# Patient Record
Sex: FEMALE | Race: BLACK OR AFRICAN AMERICAN | NOT HISPANIC OR LATINO | URBAN - METROPOLITAN AREA
[De-identification: names, ages, dates, MRNs, and addresses within clinical notes are randomized per-mention and may not be internally consistent; named-entity substitution may affect disease eponyms.]

---

## 2017-05-08 ENCOUNTER — EMERGENCY (EMERGENCY)
Facility: HOSPITAL | Age: 32
LOS: 1 days | Discharge: HOME | End: 2017-05-08
Attending: EMERGENCY MEDICINE | Admitting: EMERGENCY MEDICINE
Payer: COMMERCIAL

## 2017-05-08 VITALS
HEART RATE: 61 BPM | DIASTOLIC BLOOD PRESSURE: 75 MMHG | OXYGEN SATURATION: 100 % | RESPIRATION RATE: 20 BRPM | SYSTOLIC BLOOD PRESSURE: 111 MMHG | TEMPERATURE: 99 F

## 2017-05-08 VITALS
DIASTOLIC BLOOD PRESSURE: 78 MMHG | SYSTOLIC BLOOD PRESSURE: 108 MMHG | RESPIRATION RATE: 16 BRPM | HEART RATE: 60 BPM | OXYGEN SATURATION: 100 %

## 2017-05-08 LAB
ALBUMIN SERPL ELPH-MCNC: 4.8 G/DL — SIGNIFICANT CHANGE UP (ref 3.3–5)
ALP SERPL-CCNC: 53 U/L — SIGNIFICANT CHANGE UP (ref 40–120)
ALT FLD-CCNC: 12 U/L — SIGNIFICANT CHANGE UP (ref 4–33)
APPEARANCE UR: CLEAR — SIGNIFICANT CHANGE UP
AST SERPL-CCNC: 17 U/L — SIGNIFICANT CHANGE UP (ref 4–32)
BASOPHILS # BLD AUTO: 0.01 K/UL — SIGNIFICANT CHANGE UP (ref 0–0.2)
BASOPHILS NFR BLD AUTO: 0.2 % — SIGNIFICANT CHANGE UP (ref 0–2)
BILIRUB SERPL-MCNC: 0.6 MG/DL — SIGNIFICANT CHANGE UP (ref 0.2–1.2)
BILIRUB UR-MCNC: NEGATIVE — SIGNIFICANT CHANGE UP
BLOOD UR QL VISUAL: NEGATIVE — SIGNIFICANT CHANGE UP
BUN SERPL-MCNC: 8 MG/DL — SIGNIFICANT CHANGE UP (ref 7–23)
CALCIUM SERPL-MCNC: 9.6 MG/DL — SIGNIFICANT CHANGE UP (ref 8.4–10.5)
CHLORIDE SERPL-SCNC: 101 MMOL/L — SIGNIFICANT CHANGE UP (ref 98–107)
CO2 SERPL-SCNC: 26 MMOL/L — SIGNIFICANT CHANGE UP (ref 22–31)
COLOR SPEC: SIGNIFICANT CHANGE UP
CREAT SERPL-MCNC: 0.93 MG/DL — SIGNIFICANT CHANGE UP (ref 0.5–1.3)
EOSINOPHIL # BLD AUTO: 0.01 K/UL — SIGNIFICANT CHANGE UP (ref 0–0.5)
EOSINOPHIL NFR BLD AUTO: 0.2 % — SIGNIFICANT CHANGE UP (ref 0–6)
GLUCOSE SERPL-MCNC: 90 MG/DL — SIGNIFICANT CHANGE UP (ref 70–99)
GLUCOSE UR-MCNC: NEGATIVE — SIGNIFICANT CHANGE UP
HCG UR-SCNC: NEGATIVE — SIGNIFICANT CHANGE UP
HCT VFR BLD CALC: 39.5 % — SIGNIFICANT CHANGE UP (ref 34.5–45)
HGB BLD-MCNC: 13.7 G/DL — SIGNIFICANT CHANGE UP (ref 11.5–15.5)
IMM GRANULOCYTES NFR BLD AUTO: 0.2 % — SIGNIFICANT CHANGE UP (ref 0–1.5)
KETONES UR-MCNC: NEGATIVE — SIGNIFICANT CHANGE UP
LEUKOCYTE ESTERASE UR-ACNC: NEGATIVE — SIGNIFICANT CHANGE UP
LYMPHOCYTES # BLD AUTO: 1.85 K/UL — SIGNIFICANT CHANGE UP (ref 1–3.3)
LYMPHOCYTES # BLD AUTO: 32.5 % — SIGNIFICANT CHANGE UP (ref 13–44)
MCHC RBC-ENTMCNC: 29.6 PG — SIGNIFICANT CHANGE UP (ref 27–34)
MCHC RBC-ENTMCNC: 34.7 % — SIGNIFICANT CHANGE UP (ref 32–36)
MCV RBC AUTO: 85.3 FL — SIGNIFICANT CHANGE UP (ref 80–100)
MONOCYTES # BLD AUTO: 0.19 K/UL — SIGNIFICANT CHANGE UP (ref 0–0.9)
MONOCYTES NFR BLD AUTO: 3.3 % — SIGNIFICANT CHANGE UP (ref 2–14)
NEUTROPHILS # BLD AUTO: 3.63 K/UL — SIGNIFICANT CHANGE UP (ref 1.8–7.4)
NEUTROPHILS NFR BLD AUTO: 63.6 % — SIGNIFICANT CHANGE UP (ref 43–77)
NITRITE UR-MCNC: NEGATIVE — SIGNIFICANT CHANGE UP
PH UR: 7.5 — SIGNIFICANT CHANGE UP (ref 4.6–8)
PLATELET # BLD AUTO: 274 K/UL — SIGNIFICANT CHANGE UP (ref 150–400)
PMV BLD: 9.5 FL — SIGNIFICANT CHANGE UP (ref 7–13)
POTASSIUM SERPL-MCNC: 4.5 MMOL/L — SIGNIFICANT CHANGE UP (ref 3.5–5.3)
POTASSIUM SERPL-SCNC: 4.5 MMOL/L — SIGNIFICANT CHANGE UP (ref 3.5–5.3)
PROT SERPL-MCNC: 8.5 G/DL — HIGH (ref 6–8.3)
PROT UR-MCNC: NEGATIVE — SIGNIFICANT CHANGE UP
RBC # BLD: 4.63 M/UL — SIGNIFICANT CHANGE UP (ref 3.8–5.2)
RBC # FLD: 12.6 % — SIGNIFICANT CHANGE UP (ref 10.3–14.5)
SODIUM SERPL-SCNC: 139 MMOL/L — SIGNIFICANT CHANGE UP (ref 135–145)
SP GR SPEC: 1.01 — SIGNIFICANT CHANGE UP (ref 1–1.03)
SQUAMOUS # UR AUTO: SIGNIFICANT CHANGE UP
UROBILINOGEN FLD QL: NORMAL E.U. — SIGNIFICANT CHANGE UP (ref 0.1–0.2)
WBC # BLD: 5.7 K/UL — SIGNIFICANT CHANGE UP (ref 3.8–10.5)
WBC # FLD AUTO: 5.7 K/UL — SIGNIFICANT CHANGE UP (ref 3.8–10.5)

## 2017-05-08 PROCEDURE — 99285 EMERGENCY DEPT VISIT HI MDM: CPT

## 2017-05-08 PROCEDURE — 74177 CT ABD & PELVIS W/CONTRAST: CPT | Mod: 26

## 2017-05-08 PROCEDURE — 76830 TRANSVAGINAL US NON-OB: CPT | Mod: 26

## 2017-05-08 RX ORDER — ONDANSETRON 8 MG/1
4 TABLET, FILM COATED ORAL ONCE
Qty: 0 | Refills: 0 | Status: COMPLETED | OUTPATIENT
Start: 2017-05-08 | End: 2017-05-08

## 2017-05-08 RX ORDER — SODIUM CHLORIDE 9 MG/ML
1000 INJECTION INTRAMUSCULAR; INTRAVENOUS; SUBCUTANEOUS ONCE
Qty: 0 | Refills: 0 | Status: COMPLETED | OUTPATIENT
Start: 2017-05-08 | End: 2017-05-08

## 2017-05-08 RX ORDER — ACETAMINOPHEN 500 MG
1000 TABLET ORAL ONCE
Qty: 0 | Refills: 0 | Status: COMPLETED | OUTPATIENT
Start: 2017-05-08 | End: 2017-05-08

## 2017-05-08 RX ORDER — ONDANSETRON 8 MG/1
1 TABLET, FILM COATED ORAL
Qty: 8 | Refills: 0 | OUTPATIENT
Start: 2017-05-08 | End: 2017-05-12

## 2017-05-08 RX ORDER — MORPHINE SULFATE 50 MG/1
4 CAPSULE, EXTENDED RELEASE ORAL ONCE
Qty: 0 | Refills: 0 | Status: DISCONTINUED | OUTPATIENT
Start: 2017-05-08 | End: 2017-05-08

## 2017-05-08 RX ORDER — KETOROLAC TROMETHAMINE 30 MG/ML
15 SYRINGE (ML) INJECTION ONCE
Qty: 0 | Refills: 0 | Status: DISCONTINUED | OUTPATIENT
Start: 2017-05-08 | End: 2017-05-08

## 2017-05-08 RX ADMIN — SODIUM CHLORIDE 1000 MILLILITER(S): 9 INJECTION INTRAMUSCULAR; INTRAVENOUS; SUBCUTANEOUS at 09:24

## 2017-05-08 RX ADMIN — Medication 15 MILLIGRAM(S): at 09:33

## 2017-05-08 RX ADMIN — MORPHINE SULFATE 4 MILLIGRAM(S): 50 CAPSULE, EXTENDED RELEASE ORAL at 09:38

## 2017-05-08 RX ADMIN — ONDANSETRON 4 MILLIGRAM(S): 8 TABLET, FILM COATED ORAL at 09:35

## 2017-05-08 RX ADMIN — Medication 15 MILLIGRAM(S): at 09:58

## 2017-05-08 RX ADMIN — Medication 400 MILLIGRAM(S): at 10:57

## 2017-05-08 RX ADMIN — MORPHINE SULFATE 4 MILLIGRAM(S): 50 CAPSULE, EXTENDED RELEASE ORAL at 09:58

## 2017-05-08 NOTE — ED PROVIDER NOTE - OBJECTIVE STATEMENT
31F pmh chronic pain p/w R flank, RLQ, R leg pain that's acute on chronic associated w/ new n/v//d. pt has had this pain for 3 years, but worse today. pt states percocet helps her pain. denies any vaginal bleeding, vaginal dc, dysuria, hematuria, cp, sob, hx sti, f. pt has had colonscopy, egd, dx laproscopy to r/o endometriosis all of which have been normal

## 2017-05-08 NOTE — ED ADULT TRIAGE NOTE - CHIEF COMPLAINT QUOTE
Pt co RLQ pain that radiates into right flank area and down leg since this am with nausea and vomiting. Pt is afebrile in triage . LMP 4/20.

## 2017-05-08 NOTE — ED PROVIDER NOTE - MEDICAL DECISION MAKING DETAILS
acute on chronic pain but with new n/v/d. ddx: msk pain. will r/o ovarian torsion.  -labs -tvus -symptomatic treatment -ivf -ucg negative - acute on chronic pain but with new n/v/d. ddx: msk pain. will r/o ovarian torsion.  -labs -tvus -symptomatic treatment -ivf -ucg negative -  Fink att: 32 yo woman presenting with acute on chronic abdominal pain.  States pain is exactly the same and no different from previous episodes.  With RLQ pain, appendicitis is a consideration.  Patient has had multiple CT abdomen's in the past, I had a lengthy discussion with patient as to the morbidity and motrality of appendicitis vs. the risk of radiation from repeated CT.  Shared decision making employed, patient declines CT at this time.

## 2017-05-08 NOTE — ED PROVIDER NOTE - PHYSICAL EXAMINATION
Ayah att: General: Well appearing, nontoxic, no acute distress; Head: Normocephalic Atraumatic; Eyes: PERRL, EOMI; ENT: Airway patent; Neck: supple; Chest: Lungs clear to auscultation bilateral; Cardiac: Regular rate and rhythm, no murmurs, rubs or gallops; Abdomen: soft, no mcburneys tenderness, tender at right low pelvis, nondistended; no guarding or rebound; Musculoskeletal: Calves symmetric, nontender, no palpable cord; Skin: No rash, normal skin tone; Neuro: Alert and Oriented to person, place, and time; No focal deficit, CN 2-12 symmetric and intact, sttrength 5/5 LEs

## 2017-05-08 NOTE — ED PROVIDER NOTE - PROGRESS NOTE DETAILS
maxime: PT seen and reassessed.  Patient symptomatically improved.   AAOX3, NAD, VSS.  Discussed test results w/ patient. Patient verbalized understanding of hospital course and outpatient plans, has decisional making capacity.  Will f/u w/ pmd in the next few days; patient will call for an appointment. Will return to the ED if there is any worsening of symptoms.  Patient able to ambulate at baseline, is tolerating PO intake

## 2018-08-17 ENCOUNTER — APPOINTMENT (OUTPATIENT)
Dept: RHEUMATOLOGY | Facility: CLINIC | Age: 33
End: 2018-08-17
Payer: COMMERCIAL

## 2018-08-17 VITALS
BODY MASS INDEX: 21.97 KG/M2 | HEART RATE: 69 BPM | SYSTOLIC BLOOD PRESSURE: 103 MMHG | RESPIRATION RATE: 16 BRPM | OXYGEN SATURATION: 98 % | HEIGHT: 67 IN | WEIGHT: 140 LBS | DIASTOLIC BLOOD PRESSURE: 67 MMHG

## 2018-08-17 PROCEDURE — 99204 OFFICE O/P NEW MOD 45 MIN: CPT

## 2018-08-18 LAB — RHEUMATOID FACT SER QL: <10 IU/ML

## 2018-08-19 LAB
CCP AB SER IA-ACNC: <8 UNITS
RF+CCP IGG SER-IMP: NEGATIVE

## 2018-08-20 LAB
ANA SER IF-ACNC: NEGATIVE
HLA-B27 RELATED AG QL: NORMAL

## 2018-08-21 LAB
ENA SS-A AB SER IA-ACNC: <0.2 AL
ENA SS-B AB SER IA-ACNC: <0.2 AL

## 2018-08-22 ENCOUNTER — FORM ENCOUNTER (OUTPATIENT)
Age: 33
End: 2018-08-22

## 2018-08-23 ENCOUNTER — OUTPATIENT (OUTPATIENT)
Dept: OUTPATIENT SERVICES | Facility: HOSPITAL | Age: 33
LOS: 1 days | End: 2018-08-23
Payer: COMMERCIAL

## 2018-08-23 ENCOUNTER — APPOINTMENT (OUTPATIENT)
Dept: RADIOLOGY | Facility: CLINIC | Age: 33
End: 2018-08-23
Payer: COMMERCIAL

## 2018-08-23 ENCOUNTER — APPOINTMENT (OUTPATIENT)
Dept: ULTRASOUND IMAGING | Facility: CLINIC | Age: 33
End: 2018-08-23
Payer: COMMERCIAL

## 2018-08-23 DIAGNOSIS — R59.0 LOCALIZED ENLARGED LYMPH NODES: ICD-10-CM

## 2018-08-23 DIAGNOSIS — M89.9 DISORDER OF BONE, UNSPECIFIED: ICD-10-CM

## 2018-08-23 DIAGNOSIS — M54.5 LOW BACK PAIN: ICD-10-CM

## 2018-08-23 DIAGNOSIS — M41.9 SCOLIOSIS, UNSPECIFIED: ICD-10-CM

## 2018-08-23 PROCEDURE — 73522 X-RAY EXAM HIPS BI 3-4 VIEWS: CPT | Mod: 26

## 2018-08-23 PROCEDURE — 72082 X-RAY EXAM ENTIRE SPI 2/3 VW: CPT | Mod: 26

## 2018-08-23 PROCEDURE — 76536 US EXAM OF HEAD AND NECK: CPT | Mod: 26

## 2018-08-23 PROCEDURE — 76536 US EXAM OF HEAD AND NECK: CPT

## 2018-08-23 PROCEDURE — 73522 X-RAY EXAM HIPS BI 3-4 VIEWS: CPT

## 2018-08-23 PROCEDURE — 72082 X-RAY EXAM ENTIRE SPI 2/3 VW: CPT

## 2018-09-09 ENCOUNTER — FORM ENCOUNTER (OUTPATIENT)
Age: 33
End: 2018-09-09

## 2018-09-10 ENCOUNTER — APPOINTMENT (OUTPATIENT)
Dept: MRI IMAGING | Facility: CLINIC | Age: 33
End: 2018-09-10
Payer: COMMERCIAL

## 2018-09-10 ENCOUNTER — OUTPATIENT (OUTPATIENT)
Dept: OUTPATIENT SERVICES | Facility: HOSPITAL | Age: 33
LOS: 1 days | End: 2018-09-10
Payer: COMMERCIAL

## 2018-09-10 DIAGNOSIS — M89.9 DISORDER OF BONE, UNSPECIFIED: ICD-10-CM

## 2018-09-10 DIAGNOSIS — Z00.8 ENCOUNTER FOR OTHER GENERAL EXAMINATION: ICD-10-CM

## 2018-09-10 PROCEDURE — 72195 MRI PELVIS W/O DYE: CPT | Mod: 26

## 2018-09-10 PROCEDURE — 72195 MRI PELVIS W/O DYE: CPT

## 2018-09-19 ENCOUNTER — APPOINTMENT (OUTPATIENT)
Dept: RHEUMATOLOGY | Facility: CLINIC | Age: 33
End: 2018-09-19
Payer: COMMERCIAL

## 2018-09-19 VITALS
RESPIRATION RATE: 14 BRPM | DIASTOLIC BLOOD PRESSURE: 72 MMHG | SYSTOLIC BLOOD PRESSURE: 113 MMHG | OXYGEN SATURATION: 98 % | HEIGHT: 67 IN | HEART RATE: 70 BPM | BODY MASS INDEX: 21.97 KG/M2 | TEMPERATURE: 97.8 F | WEIGHT: 140 LBS

## 2018-09-19 PROCEDURE — 99213 OFFICE O/P EST LOW 20 MIN: CPT

## 2018-10-01 ENCOUNTER — FORM ENCOUNTER (OUTPATIENT)
Age: 33
End: 2018-10-01

## 2018-10-02 ENCOUNTER — OUTPATIENT (OUTPATIENT)
Dept: OUTPATIENT SERVICES | Facility: HOSPITAL | Age: 33
LOS: 1 days | End: 2018-10-02
Payer: COMMERCIAL

## 2018-10-02 ENCOUNTER — APPOINTMENT (OUTPATIENT)
Dept: MRI IMAGING | Facility: CLINIC | Age: 33
End: 2018-10-02
Payer: COMMERCIAL

## 2018-10-02 DIAGNOSIS — M54.5 LOW BACK PAIN: ICD-10-CM

## 2018-10-02 PROCEDURE — 72148 MRI LUMBAR SPINE W/O DYE: CPT | Mod: 26

## 2018-10-02 PROCEDURE — 72148 MRI LUMBAR SPINE W/O DYE: CPT

## 2018-12-17 NOTE — ED PROVIDER NOTE - PSH
Patient is requesting a refill for her Aledronate and Omeprazole prescriptions.
No significant past surgical history

## 2019-11-27 ENCOUNTER — APPOINTMENT (OUTPATIENT)
Dept: RHEUMATOLOGY | Facility: CLINIC | Age: 34
End: 2019-11-27
Payer: COMMERCIAL

## 2019-11-27 VITALS
BODY MASS INDEX: 22.29 KG/M2 | SYSTOLIC BLOOD PRESSURE: 107 MMHG | TEMPERATURE: 97.6 F | HEART RATE: 73 BPM | OXYGEN SATURATION: 99 % | HEIGHT: 67 IN | DIASTOLIC BLOOD PRESSURE: 69 MMHG | WEIGHT: 142 LBS

## 2019-11-27 PROCEDURE — 99204 OFFICE O/P NEW MOD 45 MIN: CPT

## 2019-11-27 PROCEDURE — 99214 OFFICE O/P EST MOD 30 MIN: CPT

## 2019-11-27 RX ORDER — GABAPENTIN 300 MG/1
300 CAPSULE ORAL
Refills: 0 | Status: DISCONTINUED | COMMUNITY
End: 2019-11-27

## 2019-11-29 LAB
ALBUMIN SERPL ELPH-MCNC: 4.5 G/DL
ALP BLD-CCNC: 39 U/L
ALT SERPL-CCNC: 8 U/L
ANION GAP SERPL CALC-SCNC: 11 MMOL/L
APPEARANCE: CLEAR
AST SERPL-CCNC: 13 U/L
BASOPHILS # BLD AUTO: 0.04 K/UL
BASOPHILS NFR BLD AUTO: 0.7 %
BILIRUB SERPL-MCNC: 0.4 MG/DL
BILIRUBIN URINE: NEGATIVE
BLOOD URINE: NEGATIVE
BUN SERPL-MCNC: 9 MG/DL
CALCIUM SERPL-MCNC: 9.1 MG/DL
CHLORIDE SERPL-SCNC: 104 MMOL/L
CO2 SERPL-SCNC: 25 MMOL/L
COLOR: NORMAL
CREAT SERPL-MCNC: 0.91 MG/DL
CRP SERPL-MCNC: <0.1 MG/DL
EOSINOPHIL # BLD AUTO: 0.08 K/UL
EOSINOPHIL NFR BLD AUTO: 1.4 %
ERYTHROCYTE [SEDIMENTATION RATE] IN BLOOD BY WESTERGREN METHOD: 7 MM/HR
GLUCOSE QUALITATIVE U: NEGATIVE
GLUCOSE SERPL-MCNC: 78 MG/DL
HCT VFR BLD CALC: 36.8 %
HGB BLD-MCNC: 12.6 G/DL
IMM GRANULOCYTES NFR BLD AUTO: 0 %
KETONES URINE: NEGATIVE
LEUKOCYTE ESTERASE URINE: NEGATIVE
LYMPHOCYTES # BLD AUTO: 3.03 K/UL
LYMPHOCYTES NFR BLD AUTO: 53.3 %
MAN DIFF?: NORMAL
MCHC RBC-ENTMCNC: 30.9 PG
MCHC RBC-ENTMCNC: 34.2 GM/DL
MCV RBC AUTO: 90.2 FL
MONOCYTES # BLD AUTO: 0.37 K/UL
MONOCYTES NFR BLD AUTO: 6.5 %
NEUTROPHILS # BLD AUTO: 2.16 K/UL
NEUTROPHILS NFR BLD AUTO: 38.1 %
NITRITE URINE: NEGATIVE
PH URINE: 6.5
PLATELET # BLD AUTO: 290 K/UL
POTASSIUM SERPL-SCNC: 4.1 MMOL/L
PROT SERPL-MCNC: 7 G/DL
PROTEIN URINE: NEGATIVE
RBC # BLD: 4.08 M/UL
RBC # FLD: 12.3 %
SODIUM SERPL-SCNC: 140 MMOL/L
SPECIFIC GRAVITY URINE: 1.01
TSH SERPL-ACNC: 0.54 UIU/ML
UROBILINOGEN URINE: NORMAL
WBC # FLD AUTO: 5.68 K/UL

## 2019-12-01 ENCOUNTER — FORM ENCOUNTER (OUTPATIENT)
Age: 34
End: 2019-12-01

## 2019-12-02 ENCOUNTER — APPOINTMENT (OUTPATIENT)
Dept: RADIOLOGY | Facility: IMAGING CENTER | Age: 34
End: 2019-12-02
Payer: COMMERCIAL

## 2019-12-02 ENCOUNTER — OUTPATIENT (OUTPATIENT)
Dept: OUTPATIENT SERVICES | Facility: HOSPITAL | Age: 34
LOS: 1 days | End: 2019-12-02
Payer: COMMERCIAL

## 2019-12-02 DIAGNOSIS — M46.1 SACROILIITIS, NOT ELSEWHERE CLASSIFIED: ICD-10-CM

## 2019-12-02 DIAGNOSIS — R10.2 PELVIC AND PERINEAL PAIN: ICD-10-CM

## 2019-12-02 PROCEDURE — 72200 X-RAY EXAM SI JOINTS: CPT | Mod: 26

## 2019-12-02 PROCEDURE — 72200 X-RAY EXAM SI JOINTS: CPT

## 2019-12-12 ENCOUNTER — CHART COPY (OUTPATIENT)
Age: 34
End: 2019-12-12

## 2019-12-25 ENCOUNTER — FORM ENCOUNTER (OUTPATIENT)
Age: 34
End: 2019-12-25

## 2019-12-26 ENCOUNTER — OUTPATIENT (OUTPATIENT)
Dept: OUTPATIENT SERVICES | Facility: HOSPITAL | Age: 34
LOS: 1 days | End: 2019-12-26
Payer: COMMERCIAL

## 2019-12-26 ENCOUNTER — APPOINTMENT (OUTPATIENT)
Dept: MRI IMAGING | Facility: CLINIC | Age: 34
End: 2019-12-26
Payer: COMMERCIAL

## 2019-12-26 DIAGNOSIS — R10.2 PELVIC AND PERINEAL PAIN: ICD-10-CM

## 2019-12-26 DIAGNOSIS — M45.1 ANKYLOSING SPONDYLITIS OF OCCIPITO-ATLANTO-AXIAL REGION: ICD-10-CM

## 2019-12-26 PROCEDURE — A9585: CPT

## 2019-12-26 PROCEDURE — 72197 MRI PELVIS W/O & W/DYE: CPT | Mod: 26

## 2019-12-26 PROCEDURE — 72197 MRI PELVIS W/O & W/DYE: CPT

## 2020-01-28 ENCOUNTER — APPOINTMENT (OUTPATIENT)
Dept: RHEUMATOLOGY | Facility: CLINIC | Age: 35
End: 2020-01-28

## 2020-06-04 ENCOUNTER — OUTPATIENT (OUTPATIENT)
Dept: OUTPATIENT SERVICES | Facility: HOSPITAL | Age: 35
LOS: 1 days | End: 2020-06-04

## 2020-06-04 LAB — SARS-COV-2 RNA SPEC QL NAA+PROBE: SIGNIFICANT CHANGE UP

## 2020-11-09 ENCOUNTER — TRANSCRIPTION ENCOUNTER (OUTPATIENT)
Age: 35
End: 2020-11-09

## 2020-11-17 ENCOUNTER — APPOINTMENT (OUTPATIENT)
Dept: RHEUMATOLOGY | Facility: CLINIC | Age: 35
End: 2020-11-17

## 2021-01-04 ENCOUNTER — APPOINTMENT (OUTPATIENT)
Dept: UROLOGY | Facility: CLINIC | Age: 36
End: 2021-01-04
Payer: COMMERCIAL

## 2021-01-04 VITALS
OXYGEN SATURATION: 97 % | HEART RATE: 80 BPM | SYSTOLIC BLOOD PRESSURE: 107 MMHG | DIASTOLIC BLOOD PRESSURE: 71 MMHG | BODY MASS INDEX: 22.29 KG/M2 | WEIGHT: 142 LBS | TEMPERATURE: 98.3 F | HEIGHT: 67 IN | RESPIRATION RATE: 14 BRPM

## 2021-01-04 DIAGNOSIS — Z80.8 FAMILY HISTORY OF MALIGNANT NEOPLASM OF OTHER ORGANS OR SYSTEMS: ICD-10-CM

## 2021-01-04 DIAGNOSIS — N28.1 CYST OF KIDNEY, ACQUIRED: ICD-10-CM

## 2021-01-04 DIAGNOSIS — Z80.42 FAMILY HISTORY OF MALIGNANT NEOPLASM OF PROSTATE: ICD-10-CM

## 2021-01-04 DIAGNOSIS — Z84.1 FAMILY HISTORY OF DISORDERS OF KIDNEY AND URETER: ICD-10-CM

## 2021-01-04 DIAGNOSIS — Z87.898 PERSONAL HISTORY OF OTHER SPECIFIED CONDITIONS: ICD-10-CM

## 2021-01-04 DIAGNOSIS — Z80.49 FAMILY HISTORY OF MALIGNANT NEOPLASM OF OTHER GENITAL ORGANS: ICD-10-CM

## 2021-01-04 PROCEDURE — 99072 ADDL SUPL MATRL&STAF TM PHE: CPT

## 2021-01-04 PROCEDURE — 99203 OFFICE O/P NEW LOW 30 MIN: CPT

## 2021-01-04 NOTE — ASSESSMENT
[FreeTextEntry1] : CT scan images were reviewed on the computer screen which showed a large left peripelvic cyst.  Simple renal cysts are sacs filled with fluid. The exact cause of renal cysts is unknown but cysts are more common with advancing age. Up to one third of patients over age 70 have renal cysts. Having many renal cysts is different than polycystic renal disease. Simple renal cysts are usually found incidentally with imaging studies (US, CT scan or MRI), are usually asymptomatic and do not require any treatment. Very large renal cysts can cause dull pain or discomfort. Cysts can rarely become infected, develop bleeding, rupture or impair renal function. Surgical removal or drainage and sclerotherapy of renal cysts can be performed in specific clinical settings. Bosniak classification of renal cysts into 5 categories was reviewed: simple (category I), minimally complex (category II and IIF), indeterminate (category III) and solid (category IV). Malignancy risk, treatment and follow-up of renal cysts based on category were discussed. Patient's questions were answered.  She will continue monitoring it for now since she is asymptomatic with repeat imaging in 1 year.

## 2021-01-04 NOTE — REVIEW OF SYSTEMS
[Eyesight Problems] : eyesight problems [Palpitations] : palpitations [Abdominal Pain] : abdominal pain [Vomiting] : vomiting [Loss of interest] : loss of interest in sexual activity [Genital yeast infection] : genital yeast infection [Skin Lesions] : skin lesion [Skin Wound] : skin wound [Anxiety] : anxiety [Depression] : depression [Negative] : Heme/Lymph [FreeTextEntry2] : cyst left kidney

## 2021-01-04 NOTE — PHYSICAL EXAM
[General Appearance - Well Developed] : well developed [General Appearance - Well Nourished] : well nourished [Normal Appearance] : normal appearance [Well Groomed] : well groomed [General Appearance - In No Acute Distress] : no acute distress [Edema] : no peripheral edema [Respiration, Rhythm And Depth] : normal respiratory rhythm and effort [Exaggerated Use Of Accessory Muscles For Inspiration] : no accessory muscle use [Abdomen Soft] : soft [Abdomen Tenderness] : non-tender [Costovertebral Angle Tenderness] : no ~M costovertebral angle tenderness [Urinary Bladder Findings] : the bladder was normal on palpation [FreeTextEntry1] : Bladder or kidney cyst not palpable [Normal Station and Gait] : the gait and station were normal for the patient's age [] : no rash [No Focal Deficits] : no focal deficits [Oriented To Time, Place, And Person] : oriented to person, place, and time [Affect] : the affect was normal [Mood] : the mood was normal [Not Anxious] : not anxious [Inguinal Lymph Nodes Enlarged Bilaterally] : inguinal

## 2021-01-04 NOTE — HISTORY OF PRESENT ILLNESS
[FreeTextEntry1] : She is a 35-year-old woman who is seen today for initial visit.  She has chronic right lower quadrant pain and follows with gastroenterology.  She has multiple radiological studies that have shown a large cyst in her left kidney going back several years.  However cyst is becoming larger.  She has no discomfort on her left side or urinary complaints.  CT scan with contrast in November 2020 showed a left 8.5 cm renal cyst.  For comparison, cyst measure 6 cm in 2014.  In 2019, creatinine level was 0.9.  Her grandmother had kidney stones and her grandfather had history of renal insufficiency.

## 2021-03-22 ENCOUNTER — APPOINTMENT (OUTPATIENT)
Dept: RHEUMATOLOGY | Facility: CLINIC | Age: 36
End: 2021-03-22
Payer: COMMERCIAL

## 2021-03-22 VITALS
SYSTOLIC BLOOD PRESSURE: 108 MMHG | HEIGHT: 67 IN | WEIGHT: 155 LBS | HEART RATE: 83 BPM | DIASTOLIC BLOOD PRESSURE: 71 MMHG | OXYGEN SATURATION: 98 % | BODY MASS INDEX: 24.33 KG/M2

## 2021-03-22 DIAGNOSIS — R10.2 PELVIC AND PERINEAL PAIN: ICD-10-CM

## 2021-03-22 DIAGNOSIS — M76.31 ILIOTIBIAL BAND SYNDROME, RIGHT LEG: ICD-10-CM

## 2021-03-22 DIAGNOSIS — R76.8 OTHER SPECIFIED ABNORMAL IMMUNOLOGICAL FINDINGS IN SERUM: ICD-10-CM

## 2021-03-22 DIAGNOSIS — M89.9 DISORDER OF BONE, UNSPECIFIED: ICD-10-CM

## 2021-03-22 DIAGNOSIS — M95.5 ACQUIRED DEFORMITY OF PELVIS: ICD-10-CM

## 2021-03-22 DIAGNOSIS — M62.89 OTHER SPECIFIED DISORDERS OF MUSCLE: ICD-10-CM

## 2021-03-22 DIAGNOSIS — M54.5 LOW BACK PAIN: ICD-10-CM

## 2021-03-22 PROCEDURE — 99214 OFFICE O/P EST MOD 30 MIN: CPT | Mod: 25

## 2021-03-22 PROCEDURE — 99072 ADDL SUPL MATRL&STAF TM PHE: CPT

## 2021-03-22 RX ORDER — OMEPRAZOLE 20 MG/1
20 CAPSULE, DELAYED RELEASE ORAL
Qty: 28 | Refills: 0 | Status: DISCONTINUED | COMMUNITY
Start: 2020-11-25 | End: 2021-03-22

## 2021-03-22 RX ORDER — DOXYCYCLINE HYCLATE 100 MG/1
100 CAPSULE ORAL
Qty: 28 | Refills: 0 | Status: DISCONTINUED | COMMUNITY
Start: 2020-11-25 | End: 2021-03-22

## 2021-03-22 RX ORDER — NYSTATIN 100000 [USP'U]/ML
100000 SUSPENSION ORAL
Qty: 112 | Refills: 0 | Status: DISCONTINUED | COMMUNITY
Start: 2020-12-07 | End: 2021-03-22

## 2021-03-22 RX ORDER — METRONIDAZOLE 500 MG/1
500 TABLET ORAL
Qty: 28 | Refills: 0 | Status: DISCONTINUED | COMMUNITY
Start: 2020-11-25 | End: 2021-03-22

## 2021-03-22 RX ORDER — LEVONORGESTREL AND ETHINYL ESTRADIOL AND ETHINYL ESTRADIOL 150-30(84)
0.15-0.03 &0.01 KIT ORAL
Qty: 91 | Refills: 0 | Status: ACTIVE | COMMUNITY
Start: 2020-01-31

## 2021-03-22 NOTE — HISTORY OF PRESENT ILLNESS
[FreeTextEntry1] : 35y F here for evaluation of R-sided pelvic / RLE pain\par \par - reports > 7 years of symptoms involving her right side of pain from her lateral hip down her thigh to just proximal to the knee\par - reports intolerance to NSAIDs, APAP ineffective, poor tolerance of SSRI/SNRI, and some improvements w/ Percocet and CBD tincture/edibles.\par - denies any joint swelling\par - pain variable 3-7/10 a/w no AM stiffness, +aching, sharp at times\par - previous imaging studies and labs reviewed\par - discussed treatment options today\par - ROS: denies constitutional sxs of fever/chills, weight loss, alopecia, oral/nasal ulcers, sicca sxs, CP/SOB, hematuria/frothy urine, myalgias, arthralgias/arthritis, Raynaud's, rash, nodules, or photosensitivity.  [de-identified] : \par \par All other ROS negative except as mentioned in HPI.

## 2021-03-22 NOTE — DATA REVIEWED
[FreeTextEntry1] : CTAP 9/2014 - diastasis abdominis rectus w/o aneurysm, soft tissues normal, scattered nonspecific hypoattenuating hepatic lesions <4-5mm and too small to characterize. +Splenule. Simple renal cyst on L.\par \par MRI pelvis 12/2019 - subcm T2 hypointense lesion on L adnexa- hemorrhagic cyst vs endometrioma\par \par MRI bony pelvis 9/2018: Mild degenerative cystic changes versus small ganglion cysts on both sides of the pubic symphysis, secondary pubic symphysis arthrosis. Findings are stable in the interval since the MRI of the bony pelvis dated 7/24/2015 without significant progression.\par \par MRI LS - Multilevel spondylosis most pronounced at the level of L5-S1 at which there is a small diffuse disc bulge with superimposed right paracentral disc protrusion which contacts the descending right S1 nerve root.\par \par US head/neck 2/2018 - No evidence of cervical adenopathy\par \par XR SI 12/2019: No fractures or dislocations. Symmetric normally aligned SI joints with preserved joint spaces and intact articular cortical margins. No radiographic stigmata of sacroiliitis. Normally aligned and spaced pubic symphysis. Unremarkable partially visualized hips, pelvic osseous structures, and lower lumbar spine. No discrete lytic or blastic lesions\par \par XR scoliosis 8/2018- 12 rib pairs present. No transitional morphology lumbosacral junction vertebral segments. No compression fractures, spondylolistheses, or spondylolysis defects. Slightly negative coronal balance on the frontal view. Notable negative sagittal balance on the lateral view. \par Trace right elevated pelvic tilt. Disk space heights preserved and facet alignment maintained. \par Unremarkable SI joints and partially visualized hips. No lytic or blastic lesions.\par \par XR hips 8/2018- No hip fractures or dislocations. Intact pelvic and obturator rings and symmetrically aligned and spaced SI joints and pubic symphysis. Preserved bilateral hip joint spaces. No gross radiographic evidence for AVN. Chronic well marginated notching again seen in the parasymphyseal regions of the pubic bones. Otherwise no suspicious lytic or blastic lesions.\par \par XR thoracolumbar 8/2018- 12 rib pairs present. No transitional morphology lumbosacral junction vertebral segments. No compression fractures, spondylolistheses, or spondylolysis defects. Slightly negative coronal balance on the frontal view. Notable negative sagittal balance on the lateral view. \par Trace right elevated pelvic tilt. Disk space heights preserved and facet alignment maintained. \par Unremarkable SI joints and partially visualized hips. No lytic or blastic lesions.

## 2021-03-22 NOTE — PHYSICAL EXAM
[General Appearance - Alert] : alert [General Appearance - In No Acute Distress] : in no acute distress [Auscultation Breath Sounds / Voice Sounds] : lungs were clear to auscultation bilaterally [Heart Rate And Rhythm] : heart rate was normal and rhythm regular [Heart Sounds] : normal S1 and S2 [Heart Sounds Gallop] : no gallops [Murmurs] : no murmurs [Heart Sounds Pericardial Friction Rub] : no pericardial rub [Full Pulse] : the pedal pulses are present [Edema] : there was no peripheral edema [Bowel Sounds] : normal bowel sounds [Abdomen Soft] : soft [Abdomen Tenderness] : non-tender [] : no hepato-splenomegaly [Abdomen Mass (___ Cm)] : no abdominal mass palpated [Cervical Lymph Nodes Enlarged Posterior Bilaterally] : posterior cervical [Cervical Lymph Nodes Enlarged Anterior Bilaterally] : anterior cervical [Supraclavicular Lymph Nodes Enlarged Bilaterally] : supraclavicular [Abnormal Walk] : normal gait [Nail Clubbing] : no clubbing  or cyanosis of the fingernails [Musculoskeletal - Swelling] : no joint swelling seen [Motor Tone] : muscle strength and tone were normal [FreeTextEntry1] : peeling rash over the right 3rd digit -  [Oriented To Time, Place, And Person] : oriented to person, place, and time [Impaired Insight] : insight and judgment were intact [Affect] : the affect was normal

## 2021-03-22 NOTE — ASSESSMENT
[FreeTextEntry1] : 35y F with chronic RLE pain from lateral R hip towards proximal knee. She has no evidence previously of systemic inflammatory autoimmune rheumatic disease or MSK findings on imaging including MRI pelvis, MRI bony pelvis, XR SI, XR scoliosis, XR LS spine.  On review of imaging she has a mild R-sided pelvic tilt with pubic symphysis arthrosis, multilevel spondylosis in lumbosacral region w/ DDD mostly at L5-S1 w/o evidence of impingement. EMG/NCS were nondiagnostic for radiculopathy or neuropathy.  There is no intraabdominal or pelvic cause of her symptoms. She has some relief w/ marijuana products, we discussed obtaining medical marijuana certification for her chronic pain and reducing need for opioids.\par \par Again, she appears to have no evidence of systemic inflammatory autoimmune rheumatic disease, concern for somatic pain disorder due to pelvic tilt, pubic symphysis arthrosis, and tensor fascia kym / ITB syndrome.\par \par - Medical marijuana certificate to be given\par - c/w conservative treatment - HEP, stretching, PT programs (deferred)\par - minimal use of opioids\par - obtain labs/serologies, and markers today\par \par RTO prn based on findings\par

## 2021-04-01 ENCOUNTER — NON-APPOINTMENT (OUTPATIENT)
Age: 36
End: 2021-04-01

## 2021-04-01 LAB
25(OH)D3 SERPL-MCNC: 20.7 NG/ML
A PHAGOCYTOPH IGG TITR SER IF: NORMAL TITER
ALBUMIN SERPL ELPH-MCNC: 4.3 G/DL
ALP BLD-CCNC: 30 U/L
ALT SERPL-CCNC: 6 U/L
ANACR T: NEGATIVE
ANION GAP SERPL CALC-SCNC: 12 MMOL/L
APPEARANCE: CLEAR
AST SERPL-CCNC: 11 U/L
B BURGDOR AB SER QL IA: NEGATIVE
B BURGDOR AB SER-IMP: NEGATIVE
B BURGDOR IGM PATRN SER IB-IMP: NEGATIVE
B BURGDOR18KD IGG SER QL IB: NORMAL
B BURGDOR23KD IGG SER QL IB: PRESENT
B BURGDOR23KD IGM SER QL IB: NORMAL
B BURGDOR28KD IGG SER QL IB: NORMAL
B BURGDOR30KD IGG SER QL IB: NORMAL
B BURGDOR31KD IGG SER QL IB: NORMAL
B BURGDOR39KD IGG SER QL IB: PRESENT
B BURGDOR39KD IGM SER QL IB: NORMAL
B BURGDOR41KD IGG SER QL IB: PRESENT
B BURGDOR41KD IGM SER QL IB: NORMAL
B BURGDOR45KD IGG SER QL IB: NORMAL
B BURGDOR58KD IGG SER QL IB: NORMAL
B BURGDOR66KD IGG SER QL IB: PRESENT
B BURGDOR93KD IGG SER QL IB: NORMAL
B MICROTI IGG TITR SER: NORMAL TITER
BACTERIA: NEGATIVE
BASOPHILS # BLD AUTO: 0.03 K/UL
BASOPHILS NFR BLD AUTO: 0.8 %
BILIRUB SERPL-MCNC: 0.5 MG/DL
BILIRUBIN URINE: NEGATIVE
BLOOD URINE: ABNORMAL
BUN SERPL-MCNC: 8 MG/DL
CALCIUM SERPL-MCNC: 9.1 MG/DL
CCP AB SER IA-ACNC: <8 UNITS
CHLORIDE SERPL-SCNC: 103 MMOL/L
CO2 SERPL-SCNC: 26 MMOL/L
COLOR: YELLOW
CREAT SERPL-MCNC: 0.92 MG/DL
CRP SERPL-MCNC: <3 MG/L
E CHAFFEENSIS IGG TITR SER IF: NORMAL TITER
EOSINOPHIL # BLD AUTO: 0.15 K/UL
EOSINOPHIL NFR BLD AUTO: 4.1 %
ERYTHROCYTE [SEDIMENTATION RATE] IN BLOOD BY WESTERGREN METHOD: 4 MM/HR
ESTIMATED AVERAGE GLUCOSE: 80 MG/DL
GLUCOSE QUALITATIVE U: NEGATIVE
GLUCOSE SERPL-MCNC: 69 MG/DL
HBA1C MFR BLD HPLC: 4.4 %
HCT VFR BLD CALC: 41.4 %
HGB BLD-MCNC: 14.3 G/DL
HYALINE CASTS: 0 /LPF
IMM GRANULOCYTES NFR BLD AUTO: 0.3 %
KETONES URINE: NEGATIVE
LEUKOCYTE ESTERASE URINE: NEGATIVE
LYMPHOCYTES # BLD AUTO: 1.94 K/UL
LYMPHOCYTES NFR BLD AUTO: 53.2 %
MAN DIFF?: NORMAL
MCHC RBC-ENTMCNC: 31.7 PG
MCHC RBC-ENTMCNC: 34.5 GM/DL
MCV RBC AUTO: 91.8 FL
MICROSCOPIC-UA: NORMAL
MONOCYTES # BLD AUTO: 0.28 K/UL
MONOCYTES NFR BLD AUTO: 7.7 %
NEUTROPHILS # BLD AUTO: 1.24 K/UL
NEUTROPHILS NFR BLD AUTO: 33.9 %
NITRITE URINE: NEGATIVE
PH URINE: 6.5
PLATELET # BLD AUTO: 337 K/UL
POTASSIUM SERPL-SCNC: 4.2 MMOL/L
PROT SERPL-MCNC: 7.4 G/DL
PROTEIN URINE: NEGATIVE
RBC # BLD: 4.51 M/UL
RBC # FLD: 12 %
RED BLOOD CELLS URINE: 10 /HPF
RF+CCP IGG SER-IMP: NEGATIVE
RHEUMATOID FACT SER QL: <10 IU/ML
SODIUM SERPL-SCNC: 139 MMOL/L
SPECIFIC GRAVITY URINE: 1.01
SQUAMOUS EPITHELIAL CELLS: 6 /HPF
TSH SERPL-ACNC: 0.4 UIU/ML
URATE SERPL-MCNC: 4.4 MG/DL
UROBILINOGEN URINE: NORMAL
WBC # FLD AUTO: 3.65 K/UL
WHITE BLOOD CELLS URINE: 1 /HPF

## 2021-04-27 ENCOUNTER — EMERGENCY (EMERGENCY)
Facility: HOSPITAL | Age: 36
LOS: 1 days | Discharge: HOME | End: 2021-04-27
Attending: EMERGENCY MEDICINE | Admitting: EMERGENCY MEDICINE
Payer: COMMERCIAL

## 2021-04-27 VITALS
DIASTOLIC BLOOD PRESSURE: 78 MMHG | TEMPERATURE: 98 F | HEART RATE: 97 BPM | OXYGEN SATURATION: 100 % | RESPIRATION RATE: 18 BRPM | SYSTOLIC BLOOD PRESSURE: 120 MMHG

## 2021-04-27 LAB
ALBUMIN SERPL ELPH-MCNC: 4.3 G/DL — SIGNIFICANT CHANGE UP (ref 3.3–5)
ALP SERPL-CCNC: 27 U/L — LOW (ref 40–120)
ALT FLD-CCNC: 7 U/L — SIGNIFICANT CHANGE UP (ref 4–33)
ANION GAP SERPL CALC-SCNC: 10 MMOL/L — SIGNIFICANT CHANGE UP (ref 7–14)
APPEARANCE UR: ABNORMAL
AST SERPL-CCNC: 14 U/L — SIGNIFICANT CHANGE UP (ref 4–32)
BASOPHILS # BLD AUTO: 0.02 K/UL — SIGNIFICANT CHANGE UP (ref 0–0.2)
BASOPHILS NFR BLD AUTO: 0.4 % — SIGNIFICANT CHANGE UP (ref 0–2)
BILIRUB SERPL-MCNC: 0.5 MG/DL — SIGNIFICANT CHANGE UP (ref 0.2–1.2)
BILIRUB UR-MCNC: NEGATIVE — SIGNIFICANT CHANGE UP
BLOOD GAS VENOUS COMPREHENSIVE RESULT: SIGNIFICANT CHANGE UP
BUN SERPL-MCNC: 9 MG/DL — SIGNIFICANT CHANGE UP (ref 7–23)
CALCIUM SERPL-MCNC: 8.9 MG/DL — SIGNIFICANT CHANGE UP (ref 8.4–10.5)
CHLORIDE SERPL-SCNC: 101 MMOL/L — SIGNIFICANT CHANGE UP (ref 98–107)
CO2 SERPL-SCNC: 25 MMOL/L — SIGNIFICANT CHANGE UP (ref 22–31)
COLOR SPEC: ABNORMAL
CREAT SERPL-MCNC: 0.96 MG/DL — SIGNIFICANT CHANGE UP (ref 0.5–1.3)
DIFF PNL FLD: ABNORMAL
EOSINOPHIL # BLD AUTO: 0.09 K/UL — SIGNIFICANT CHANGE UP (ref 0–0.5)
EOSINOPHIL NFR BLD AUTO: 1.8 % — SIGNIFICANT CHANGE UP (ref 0–6)
GLUCOSE SERPL-MCNC: 137 MG/DL — HIGH (ref 70–99)
GLUCOSE UR QL: NEGATIVE — SIGNIFICANT CHANGE UP
HCT VFR BLD CALC: 38.3 % — SIGNIFICANT CHANGE UP (ref 34.5–45)
HGB BLD-MCNC: 14 G/DL — SIGNIFICANT CHANGE UP (ref 11.5–15.5)
IANC: 2.49 K/UL — SIGNIFICANT CHANGE UP (ref 1.5–8.5)
IMM GRANULOCYTES NFR BLD AUTO: 0.2 % — SIGNIFICANT CHANGE UP (ref 0–1.5)
KETONES UR-MCNC: NEGATIVE — SIGNIFICANT CHANGE UP
LEUKOCYTE ESTERASE UR-ACNC: ABNORMAL
LYMPHOCYTES # BLD AUTO: 2.22 K/UL — SIGNIFICANT CHANGE UP (ref 1–3.3)
LYMPHOCYTES # BLD AUTO: 43.4 % — SIGNIFICANT CHANGE UP (ref 13–44)
MCHC RBC-ENTMCNC: 32 PG — SIGNIFICANT CHANGE UP (ref 27–34)
MCHC RBC-ENTMCNC: 36.6 GM/DL — HIGH (ref 32–36)
MCV RBC AUTO: 87.4 FL — SIGNIFICANT CHANGE UP (ref 80–100)
MONOCYTES # BLD AUTO: 0.29 K/UL — SIGNIFICANT CHANGE UP (ref 0–0.9)
MONOCYTES NFR BLD AUTO: 5.7 % — SIGNIFICANT CHANGE UP (ref 2–14)
NEUTROPHILS # BLD AUTO: 2.49 K/UL — SIGNIFICANT CHANGE UP (ref 1.8–7.4)
NEUTROPHILS NFR BLD AUTO: 48.5 % — SIGNIFICANT CHANGE UP (ref 43–77)
NITRITE UR-MCNC: NEGATIVE — SIGNIFICANT CHANGE UP
NRBC # BLD: 0 /100 WBCS — SIGNIFICANT CHANGE UP
NRBC # FLD: 0 K/UL — SIGNIFICANT CHANGE UP
PH UR: 6.5 — SIGNIFICANT CHANGE UP (ref 5–8)
PLATELET # BLD AUTO: 284 K/UL — SIGNIFICANT CHANGE UP (ref 150–400)
POTASSIUM SERPL-MCNC: 3.7 MMOL/L — SIGNIFICANT CHANGE UP (ref 3.5–5.3)
POTASSIUM SERPL-SCNC: 3.7 MMOL/L — SIGNIFICANT CHANGE UP (ref 3.5–5.3)
PROT SERPL-MCNC: 7.6 G/DL — SIGNIFICANT CHANGE UP (ref 6–8.3)
PROT UR-MCNC: ABNORMAL
RBC # BLD: 4.38 M/UL — SIGNIFICANT CHANGE UP (ref 3.8–5.2)
RBC # FLD: 11.7 % — SIGNIFICANT CHANGE UP (ref 10.3–14.5)
SODIUM SERPL-SCNC: 136 MMOL/L — SIGNIFICANT CHANGE UP (ref 135–145)
SP GR SPEC: 1.03 — HIGH (ref 1.01–1.02)
UROBILINOGEN FLD QL: SIGNIFICANT CHANGE UP
WBC # BLD: 5.12 K/UL — SIGNIFICANT CHANGE UP (ref 3.8–10.5)
WBC # FLD AUTO: 5.12 K/UL — SIGNIFICANT CHANGE UP (ref 3.8–10.5)

## 2021-04-27 PROCEDURE — 74177 CT ABD & PELVIS W/CONTRAST: CPT | Mod: 26

## 2021-04-27 PROCEDURE — 99285 EMERGENCY DEPT VISIT HI MDM: CPT

## 2021-04-27 RX ORDER — ONDANSETRON 8 MG/1
4 TABLET, FILM COATED ORAL ONCE
Refills: 0 | Status: COMPLETED | OUTPATIENT
Start: 2021-04-27 | End: 2021-04-27

## 2021-04-27 RX ORDER — MORPHINE SULFATE 50 MG/1
4 CAPSULE, EXTENDED RELEASE ORAL ONCE
Refills: 0 | Status: DISCONTINUED | OUTPATIENT
Start: 2021-04-27 | End: 2021-04-27

## 2021-04-27 RX ADMIN — ONDANSETRON 4 MILLIGRAM(S): 8 TABLET, FILM COATED ORAL at 22:20

## 2021-04-27 RX ADMIN — MORPHINE SULFATE 4 MILLIGRAM(S): 50 CAPSULE, EXTENDED RELEASE ORAL at 22:20

## 2021-04-27 NOTE — ED PROVIDER NOTE - PATIENT PORTAL LINK FT
You can access the FollowMyHealth Patient Portal offered by [facility] by registering at the following website: [email or web address]. By joining [facility]’s FollowMyHealth portal, you will also be able to view your health information using other applications (apps) compatible with our system.

## 2021-04-27 NOTE — ED PROVIDER NOTE - PROGRESS NOTE DETAILS
Required additional dose morphine, but now feeling more comfortable. Minimal pain at present and interested in going home.  I offered cdu for serial abd exam and pt declined.  Will call for a ride home. States she is able to f/u.  All labs and imaging reviewed c pt.  She states she was not aware of findings from 2014 (printed tonight).  Will see her neuro for f/u and poss Ortho.

## 2021-04-27 NOTE — ED PROVIDER NOTE - OBJECTIVE STATEMENT
36 y/o female with a PMHx of chronic abdominal pain for x 6 years presents to the ED with c/o acute worsening abdominal pain with associated nausea since 7:30 pm tonight. Pt appears uncomfortable, clutching abdomen, and crying in pain. Pt notes last took percocet at 1 am. Pt reports pain worse after eating dinner no specific trigger. Pt denies any urinary symptoms, fever, or chills; Pt is ambulating well. Pt indicate pain is in the right inguinal and right lower abdomen. As per Pt has had extensive evaluation for pain and cause was never found. Upon chart review has had numerous pelvic U/Ss, CTs, and MRIs. 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 34 y/o female with a PMHx of chronic abdominal pain for x 6 years presents to the ED with c/o acute worsening abdominal pain with associated nausea since 7:30 pm tonight. Pt appears uncomfortable, clutching abdomen, and crying in pain. Pt notes last took percocet at 1 am (prior to exacerbation, for her chronic pain). Pt reports pain suddenly became worse after eating dinner, no specific trigger. Pt denies any urinary symptoms, fever, or chills; Pt is ambulating well. Pt indicates pain is in the right inguinal and right lower abdomen. As per Pt has had extensive evaluation for pain and cause was never found. Upon chart review has had numerous pelvic U/Ss, CTs, and MRI. 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

## 2021-04-27 NOTE — ED PROVIDER NOTE - NSFOLLOWUPINSTRUCTIONS_ED_ALL_ED_FT
A cause for you pain was not found. Please continue taking prescribed medications and follow up with your doctors. You may consider seeing an orthopedist as well. A copy of your MRI from 2014 is also included here.     Return to the ER for worsening pain, fevers, inability to urinate, vomiting, bleeding, or other concerning signs.

## 2021-04-27 NOTE — ED PROVIDER NOTE - LOCATION
7/20/2018       RE: Seth Iglesias  [address]  [facility] [address]     Dear Colleague,    Thank you for referring your patient, Seth Iglesias, to the [facility]. Please see a copy of my visit note below.    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 MGMT promoter methylation was absent.     Treatment to date: status post gross total resection 10/3/17; initiated concurrent chemoradiation with temozolomide on 11/13/17 and completed 12/22/17. Adjuvant temozolomide and Optune device started on 1/22/18.  Following progression of disease documented spring 2018, patient enrolled in the Toca trial underwent surgical debulking May 17, 2018; he was randomized to the standard of care arm (ie he did not receive the therapeutic virus intraoperatively). He developed left sided weakness in early July, within days of initiating lomustine therapy 7/4/18 (patient opted to delay starting this medication treatment by 1-2 weeks), MRI indicated rapid progression/recurrence of his tumor.     Referring physicians:  Dr. Harry Wiggins and Dr. Beto Gracia, Neurosurgical Service, [facility]  Dr. Dominic Louis, radiation oncology, [facility]  Oncology History:   Seth presented in September 2017 with dizzy spells, headache, and increasing somnolence. He was admitted on 9/28/17 after imaging in ED found a right temporal lobe mass. He went on to have gross total resection on 10/3/17.   10/27/17 - - neuro-oncology/medical oncology consultation, Dr. Armstrong.  11/12/17 through December 22, 2017 adjuvant treatment with concurrent chemoradiation with temozolomide. 6000 cGy, and 30 fractions, standard 200 cGy per fraction  1/19/18 - - brain MRI: Impression: 1. Increased enhancement along the posterior margin of the resection cavity with new areas of  nodular and ringlike enhancement which more likely represents radiation necrosis than recurrence based on perfusion. Recommend attention on follow-up. Postsurgical changes of prior resection of right temporal glioblastoma. Resolution of right frontotemporal subdural hematoma and T2 hyperintensity along the right internal capsule and commisural white matter.    1/22/18 - - oncology/medical oncology follow-up, Dr. Armstrong. Plan: initiate adjuvant five day temozolomide and OPTune device. Cycle 1/day one of temozolomide, 150 mg/m  on days one through five of each 28 day cycle.    2/20/18 - - oncology follow-up, SCARLETT Hillman. Cycle 2/day one of five day temozolomide. Dose increased to 200 mg/m .    3/15/18 - - brain MRI: Impression: In this patient with glioblastoma multiforme status post gross total resection, chemoradiation and adjuvant Temodar and Optune, there is increased nodular contrast enhancement and T2 hyperintensity at the margins of the right temporal resection cavity. Perfusion characteristics suggestive of tumor recurrence, particularly medially.    3/19/18 - - neuro-oncology/medical oncology follow-up, Dr. Armstrong.    4/6/18 - - neurology follow-up, Dr. Boyer. plan to continue Kera     4/19/18 MRI brain   1. Increased size of enhancing mass and surrounding vasogenic edema in  the right temporal lobe, which is suggestive of recurrence of  high-grade tumor, while the MR perfusion and diffusion imaging  characteristics are indeterminate for recurrent tumor (described in  detail above). Therefore, recommend shorter term follow-up study such  as one month.  2. Increasing mass effect has caused further compression of the right  lateral ventricle. No hydrocephalus at this time.    4/23/18 -- neuro-oncology follow-up, Dr. Armstrong  Consensus of multidisciplinary neuro-oncology tumor board: MRI consistent with progression of disease while on Optune and Temozolomide.  5/4/18-neurosurgical consultation with Dr. Pérez  Samia re: TOCA trial  5/11/18--neuro-oncology follow-up, Dr. Armstrong. Plan: proceed with surgical debulking/enrollment and randomization on the Toca trial.  5/17/18 - -Procedure:  tumor debulking (a portion of the tumor adherent vessels could not be resected). Patient was randomized to trial arm that did not include the Toca therapeutic virus.  1) Right craniotomy for tumor resection  2) Neuronavigation  3) Microdissection      Surgeon: Beto Ricks MD, PhD  6/1/18 - - neurosurgery follow-up, Dr. Gracia. Again noted. The patient was randomized to standard therapy.  6/19/18 - - brain MRI - stable since 5/18 post-op scan.  6/22/18--neuro-oncology follow-up, Dr. Armstrong. Plan: initiate 2nd-line treatment with lomustine (CCNU) once cleared by insurance.  NOTE: patient delayed initiation of chemotherapy with lomustine until on or around July 4, but did not notify the care team of this fact until one week later.  7/9/18: patient s wife called in due to patient being last arousable, severely worsening fatigue, slouching the left side with left-sided facial weakness, and worsening gait.  7/10/18 - - oncology follow-up, SCARLETT Hillman.  Plan: direct admission to the ER for concern for acute stroke versus progression of disease.  7/10/18 - - brain MRI, compared to June 19 MRI: Impression:  1. Right anterior temporal lobe tumor progression. Progression of  tumor into the right corpus striatum, which may explain the patient's  left upper extremity motor weakness. Extensive associated edema with 5  mm of leftward midline shift and borderline right uncal herniation.  2. No ischemic infarct.     [Result: Enlarging right temporal lobe mass]  7/10 through 7/12/18 - - hospitalization at the [facility] for severely worsening left-sided weakness and cerebral edema.  MRI was done which showed increased contrast-enhancing region and edema surrounding the resection cavity concerning for tumor progression.  Patient was  administered course of dexamethasone and prolonged course of steroids, with plans for potential repeat resection by Dr. Gracia in end of July.  7/20/18 - - neuro-oncology follow-up, Dr. Armstrong.      Interim History:  HISTORY OF PRESENT ILLNESS:  Mr. Iglesias returns today in the company of his wife.  The visit as an add-on visit at his wife's request.  Please see her Pinchd message from yesterday regarding extensive questions.  Briefly, at my last evaluation in the third week of June, he had followed up with Dr. Gracia as well.  He had the debulking surgery in under the auspices of the Tocage trial.  He had been randomized to the non-gene therapy arm.  We will evaluate him in late June and initiated standard of care therapy with lomustine still under the auspices of the trial, but for best available chemotherapy option.  My understanding had been that he would pick it up and take it once insurance had cleared it.  However, he did get the medication, but he opted not to initiate taking until 07/04 due to some family related activities.      Within a few days of that event, he was in [place] for 4 days and his wife called in stating the patient was less arousable and had severely worsened fatigue, left-sided facial weakness and gait.  We asked him to present to the nearest emergency room or come back here to the [facility] for further evaluation.  He came in the next day and saw our physician assistant, Helena Landin, and was sent immediately to the emergency room for concern of acute stroke versus rapid progression of disease.  The brain MRI done on 07/10 compared to the 06/19 MRI showed significant progression along the corpus striatum with significant edema.      He was initiated on high-dose dexamethasone and entered into our hospital through 07/12.  Dr. Gracia from the Neurosurgery Department met with the patient and indicated that he may benefit from further debulking and resection with possible thermal ablation.   "Dr. Gracia has also communicated with me prior to this appointment, and I was in favor of this as well as 1 option.  After finding out the timing of the therapies, it was confirmed that the patient did not sustain actual progression of disease while on the lomustine, but rather this rapid progression of disease likely occurred at a very rapid and short amount of time compared to that baseline scan just several weeks prior.  He continues to have left-sided weakness.  He denies any falls.  He uses either a walker or leaning on his wife to get around.  He is very lucid in discussion.  His wife had some concerns and sent a DCWaferst message to myself and Dr. Gracia yesterday, primarily focusing on interest in a poliovirus through [facility] and also the recent \"Right to Try\" legislation and what effect that would have on her 's care.               Review Of Systems:  Complete 12-point ROS reviewed. Pertinent symptoms reviewed above per HPI.    PAST MEDICAL HISTORY:   1.  Glioblastoma, status post gross total resection on 10/03/2017 of the right temporal lobe preceded and indicated by some seizure-like activity without loss of consciousness.   2.  Chronic back pain.   3.  Ganglionic cysts.   4.  Hyperlipidemia.   5.  He has psoriasis and was taking what sounds like a TNF and an alpha inhibitor up until the summer of .       PAST SURGICAL HISTORY:     1.  The aforementioned right temporal craniotomy for gross total resection of right temporal glioblastoma done 10/03/2017 with Dr. Harry Wiggins at the [facility].   2.  Colonoscopy performed 2011, unremarkable.   3. Surgical debulking of recurrent GB tumor, May 17 2018.      FAMILY HISTORY:  He had a Father and 2 paternal uncles who had prostate cancer.  His mother had some sort of \"tumor of the rib,\" and  at age 54 of this unknown cancer.       SOCIAL HISTORY:  The patient lives in [place], [place].  He is accompanied by " "his wife, who is extremely supportive.  They have 3 sons ages 18, 23 and 25.  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  He has since then been working for [employer], about 27 miles from his home.    Tobacco:  Never smoker.    Allergies:none    Current Medications:   Current Outpatient Prescriptions   Medication     dexamethasone (DECADRON) 4 MG tablet     GLEOSTINE 100 MG capsule CHEMO     GLEOSTINE 40 MG capsule CHEMO     levETIRAcetam (KEPPRA) 1000 MG TABS     Multiple Vitamins-Minerals (MULTIVITAMIN & MINERAL PO)     ondansetron (ZOFRAN-ODT) 8 MG ODT tab     oxyCODONE IR (ROXICODONE) 5 MG tablet     pantoprazole (PROTONIX) 40 MG EC tablet     polyethylene glycol (MIRALAX/GLYCOLAX) Packet     tamsulosin (FLOMAX) 0.4 MG capsule     [DISCONTINUED] Temozolomide (TEMODAR) 180 MG capsule     No current facility-administered medications for this visit.          Physical Exam:  BP (!) 130/93 (BP Location: Right arm, Patient Position: Sitting, Cuff Size: Adult Regular)  Pulse 84  Temp 97.6  F (36.4  C) (Oral)  Resp 18  Ht 1.753 m (5' 9\")  Wt 97 kg (213 lb 14.4 oz)  SpO2 96%  BMI 31.59 kg/m2  KPS 70  GENERAL:  Very pleasant middle-aged  gentleman who appears in no acute distress, alert and oriented x3.  Speech is fluent.  He does not have any evidence of receptive or expressive aphasia.   HEAD: right frontal region of craniotomy is fully healed, indented, no overlying fluctuance, erythema or tenderness.  HEENT:  Anicteric sclerae.  Oropharynx is without evidence of mucositis or thrush.    CARDIOVASCULAR:  Regular rate and rhythm, normal S1, S2.  No murmurs, gallops or rubs.   LUNGS:  Clear to auscultation throughout.   NEUROLOGIC:  Cranial nerves II-XII grossly intact.  Motor strength was 5/5 on the right, 4+/5 on the left upper and lower extremities.    Sensation is grossly intact throughout.  No evidence of " cerebellar signs, as finger-to-nose is unremarkable without tremor.  No dysdiadochokinesia.  He does have some possible new visual field abnormalities, in the form of left-sided hemianopsia, but it is unclear whether his subjective response is from lack of understanding of questions during the exam.    His gait is hesitant and favors the left side.  Negative Romberg sign.           Laboratory/Imaging Studies  Results for orders placed or performed during the hospital encounter of 07/10/18   CT Head w/o Contrast    Narrative    CT HEAD W/O CONTRAST 7/10/2018 2:14 PM    Provided History: GBM with new left side symptoms and weakness;     Comparison: MRI 6/19/2018, 9/28/2017, 9/29/2017.    Technique: Using multidetector thin collimation helical acquisition  technique, axial, coronal and sagittal CT images from the skull base  to the vertex were obtained without intravenous contrast.     Findings:    Slight increase in size of heterogeneous mass of the anterior right  temporal lobe, now measuring approximately 2.7 x 2.7 cm (previously  2.2 x 2.2 cm). Additionally, extra-axial simple fluid in the right  temporal lobe region. Significant amount of edema of the white matter  in the right frontoparietal region, extending into the temporal lobe.  This appears to correspond with the right middle cerebral artery  territory. Slight effacement of the right lateral ventricle. However,  no hydrocephalus. Basilar cisterns are patent. No intracranial  hemorrhage. Right frontotemporal craniotomy changes.    The visualized paranasal sinuses are clear. The mastoid air cells are  clear.      Impression    Impression:   1. Significant amount of edema in the right middle cerebral artery  territory which likely is related to underlying mass and presumed  progression of disease.  2. Poorly defined, heterogeneous mass in the right anterior temporal  lobe has increased in size from the comparison MRI 6/19/2018.  Contrast-enhanced brain MRI  recommended.    [Result: Edema of the right middle cerebral artery territory,  concerning for stroke]    Finding was identified on 7/10/2018 2:20 PM.     Dr. Aguilar was contacted by Dr. Issa at 7/10/2018 2:34 PM and  verbalized understanding of the urgent finding.     After staffing, it was felt that the edema in the right cerebral  hemisphere was more likely related to the underlying tumor and  possible progression.    I have personally reviewed the examination and initial interpretation  and I agree with the findings.    KIKI ANGEL MD   MR Brain w/o & w Contrast    Narrative     MR BRAIN W/O & W CONTRAST 7/10/2018 4:28 PM    Provided History: GBM with worsening symptoms.    Comparison: Earlier 7/10/2018 and 6/19/2018.    Technique: Multiplanar T1-weighted, axial FLAIR, and susceptibility  images were obtained without intravenous contrast. Following  intravenous gadolinium-based contrast administration, axial  T2-weighted, diffusion, and T1-weighted images (in multiple planes)  were obtained.    Contrast: 10 mL of Gadavist.    Findings:  Enlarging, heterogeneously enhancing mass centered in the anterior  right temporal lobe. Few foci of associated microhemorrhage on SWI,  unchanged. The mass now measures approximately 3.5 x 2.6 cm  (previously 2.3 x 1.8 cm). Additionally, there is abnormal contrast  enhancement extending into the right internal capsule and lentiform  nuclei. Relative sparing of the head of the caudate. Additionally,  substantial edema of the white matter in the right frontal and  temporal lobes with partial effacement of overlying sulci and the  ventricles. Leftward midline shift of 5 mm. Borderline right uncal  herniation. The abnormally increased T2 signal extends into the right  thalamus, right cerebral peduncle, and right midbrain. No  hydrocephalus. Postoperative fluid in the anterior aspect of the right  middle cranial fossa. Postoperative changes of right  frontotemporal  craniotomy. Possible narrowing of the M1 segment of the right middle  cerebral artery, however the flow voids in the right MCA are present.    Mild paranasal sinus mucosal thickening. Moderate right and mild left  mastoid effusions. The orbits are grossly unremarkable.      Impression    Impression:  1. Right anterior temporal lobe tumor progression. Progression of  tumor into the right corpus striatum, which may explain the patient's  left upper extremity motor weakness. Extensive associated edema with 5  mm of leftward midline shift and borderline right uncal herniation.  2. No ischemic infarct.    [Result: Enlarging right temporal lobe mass]    Finding was identified on 7/10/2018 4:31 PM.     SCARLETT Hayden was contacted by Dr. Issa at 7/10/2018 4:47 PM  and verbalized understanding of the urgent finding.     I have personally reviewed the examination and initial interpretation  and I agree with the findings.    ORLANDO SANDRA MD   CBC with platelets   Result Value Ref Range    WBC 5.0 4.0 - 11.0 10e9/L    RBC Count 3.64 (L) 4.4 - 5.9 10e12/L    Hemoglobin 11.9 (L) 13.3 - 17.7 g/dL    Hematocrit 34.3 (L) 40.0 - 53.0 %    MCV 94 78 - 100 fl    MCH 32.7 26.5 - 33.0 pg    MCHC 34.7 31.5 - 36.5 g/dL    RDW 14.1 10.0 - 15.0 %    Platelet Count 168 150 - 450 10e9/L   Basic metabolic panel   Result Value Ref Range    Sodium 139 133 - 144 mmol/L    Potassium 4.1 3.4 - 5.3 mmol/L    Chloride 106 94 - 109 mmol/L    Carbon Dioxide 22 20 - 32 mmol/L    Anion Gap 10 3 - 14 mmol/L    Glucose 129 (H) 70 - 99 mg/dL    Urea Nitrogen 20 7 - 30 mg/dL    Creatinine 1.03 0.66 - 1.25 mg/dL    GFR Estimate 74 >60 mL/min/1.7m2    GFR Estimate If Black 89 >60 mL/min/1.7m2    Calcium 8.7 8.5 - 10.1 mg/dL   CBC with platelets   Result Value Ref Range    WBC 8.7 4.0 - 11.0 10e9/L    RBC Count 3.46 (L) 4.4 - 5.9 10e12/L    Hemoglobin 11.4 (L) 13.3 - 17.7 g/dL    Hematocrit 33.3 (L) 40.0 - 53.0 %    MCV 96 78 - 100 fl  "   MCH 32.9 26.5 - 33.0 pg    MCHC 34.2 31.5 - 36.5 g/dL    RDW 14.4 10.0 - 15.0 %    Platelet Count 192 150 - 450 10e9/L   Basic metabolic panel   Result Value Ref Range    Sodium 141 133 - 144 mmol/L    Potassium 3.8 3.4 - 5.3 mmol/L    Chloride 107 94 - 109 mmol/L    Carbon Dioxide 24 20 - 32 mmol/L    Anion Gap 10 3 - 14 mmol/L    Glucose 112 (H) 70 - 99 mg/dL    Urea Nitrogen 26 7 - 30 mg/dL    Creatinine 1.00 0.66 - 1.25 mg/dL    GFR Estimate 76 >60 mL/min/1.7m2    GFR Estimate If Black >90 >60 mL/min/1.7m2    Calcium 8.6 8.5 - 10.1 mg/dL   Neurosurgery Adult IP Consult: Patient to be seen: ASAP within 4 hrs; Call back #: [phone]; cerebral edema on recent imaging with altered mental status; Consultant may enter orders: Yes    Narrative    Te Palmer MD     7/10/2018  7:38 PM  [facility]       NEUROSURGERY CONSULTATION NOTE    This consultation was requested by Dr. Delroy Salvador from the   Heme/Onc service.    Reason for Consultation: \"cerebral edema on recent imaging with   altered mental status\"    HPI:  Seth Iglesias is a 60 yo male with history of psoriasis,   hyperlipidemia, seizures, and gastro-esophageal reflux disease,   and glioblastoma s/p 2 resections (10/2017 and 05/2018).     Patient unable to provide significant history due to fatigue.   Primary amount of history is provided by wife who states that he   has been having more fatigue worsening in the last 4 days. Also   having worsening weakness of the left upper extremity, imbalance,   shuffling gait. No recent fevers, chills, nausea.     PAST MEDICAL HISTORY:   Past Medical History:   Diagnosis Date     Gastroesophageal reflux disease      Glioblastoma (H)      Psoriasis     On Enbrel Injections Q 1 month for about 10 years, [facility]     Seizures (H)      PAST SURGICAL HISTORY:   Past Surgical History:   Procedure Laterality Date     COLONOSCOPY  4/4/2011    " "Procedure:COLONOSCOPY; Surgeon:TC HARE;   Location:[facility]     OPTICAL TRACKING SYSTEM CRANIOTOMY, EXCISE TUMOR, COMBINED   Right 10/3/2017    Procedure: COMBINED OPTICAL TRACKING SYSTEM CRANIOTOMY, EXCISE   TUMOR;  Stealth Assisted Right Temporal Craniotomy For Tumor   Resection;  Surgeon: Harry Wiggins MD;  Location:  OR     OPTICAL TRACKING SYSTEM CRANIOTOMY, EXCISE TUMOR, COMBINED N/A   5/17/2018    Procedure: COMBINED OPTICAL TRACKING SYSTEM CRANIOTOMY, EXCISE   TUMOR;  Stealth Assisted Right Craniotomy And Tumor Resection ;    Surgeon: Beto Gracia MD;  Location:  OR     SURGICAL HISTORY OF -   02/05/75    Torn Meniscus Right Knee     SURGICAL HISTORY OF -   11/30/73    Arthrotomy & medial meniscectomy left knee     FAMILY HISTORY:   Family History   Problem Relation Age of Onset     C.A.D. Father      age 52     Cancer Father      prostate CA     Lung Cancer Mother      C.A.D. Paternal Uncle      50's     SOCIAL HISTORY:   Social History   Substance Use Topics     Smoking status: Never Smoker     Smokeless tobacco: Never Used     Alcohol use No      Comment: occasionally     MEDICATIONS:  Current Outpatient Prescriptions   Medication Sig Dispense Refill     [DISCONTINUED] Temozolomide (TEMODAR) 180 MG capsule Take 1   capsule (180 mg) by mouth daily in addition to 250 mg capsule for   5 days (Patient not taking: Reported on 5/4/2018) 5 capsule 0     Allergies:  Allergies   Allergen Reactions     Nkda [No Known Drug Allergies]      ROS: 10 point ROS of systems including Constitutional, Eyes,   Respiratory, Cardiovascular, Gastroenterology, Genitourinary,   Integumentary, Muscularskeletal, Psychiatric were all negative   except for pertinent positives noted in my HPI.    Physical exam:   Blood pressure 120/80, temperature 98.4  F (36.9  C), temperature   source Oral, resp. rate 18, height 1.753 m (5' 9\"), weight 95.3   kg (210 lb 1.6 oz), SpO2 91 %.  General: fatigued, eyes " "closed, intermittent snoring.   PULM: breathing comfortably on room air   NEUROLOGIC:  -- Asleep, arousable by voice and stimulation. oriented x 4.   -- Follows commands intermittently and incompletely.   -- Speech fluent, spontaneous. No aphasia or dysarthria.  -- dysconjugate gaze. Unable to assess extraocular muscles due to   eyelid apraxia.   -- visual fields unable to be tested.   -- face symmetrical. tongue protrudes midline.   -- hearing grossly intact   Motor: 5/5 strength in the R hemibody. 3/5 strength in the left   upper extremity. 2/5 in hip flexor on the left. 4/5 in   plantarflexion/dorsiflexion on L  Sensory:  intact to LT x 4 extremities     IMAGING:  MRI 7/10/18: increased contrast enhancing region and edema   surrounding the resection cavity concerning for tumor progression       LABS:   BMP within normal limits  CBC with thrombocytopenia of 123 otherwise normal  UA unremarkable     ASSESSMENT:  R temporal glioblastoma, found to have tumor progression after 2   resections    RECOMMENDATIONS:  - No neurosurgical intervention indicated at this time   - agree with decadron 4 mg q6h for treatment of cerebral edema   - initiate sliding scale insulin  - initiate protonix 40 mg qday  - continue keppra 1g bid   - patient to be staffed with Dr. Gracia in AM    The patient was discussed with Dr. Agapito Lutz, neurosurgery chief   resident, and he agrees with the above.    Te \"Sudheer\" MD [facility]   Neurosurgery, PGY-2    Please contact neurosurgery resident on call with questions.    Dial * * [phone], enter [phone] when prompted.          ASSESSMENT/PLAN:  Seth Iglesias is a 58 y/o man with right temporal glioblastoma (WHO grade IV). He had gross total resection on 10/3/17 and completed adjuvant chemorads on 12/22/17. His 4 week post-treatment brain MRI showed areas of enhancement around the resection cavity thought to represent radiation necrosis rather than recurrence.     He then started temozolomide maintenance in " January 2018, at a dose of 150 mg/m2 for cycle 1. This was increased to 200 mg/m2 for cycle 2. He also started the Optune device in January 2018.     Upon progression of disease after about less than 6 months of temozolomide in the adjuvant setting, he underwent surgical debulking following randomization on the Tocagen trial to the non-viral therapeutic arm.  He underwent surgical debulking.  Dr. Gracia was not able to obtain a full total gross resection for this secondary surgery due to the involvement of some of the vessels.      My impression overall is the patient has had a rapid progression of disease over the last few months after randomizing to the non-viral arm on the Tocagen trial.  We initiated standard-of-care lomustine in late June.  It was prescribed to him on 06/30. However, the patient did not begin taking this medication until 07/04.  Within a few days of that, he developed left-sided weakness.  He came back from [place] and was then found to have rapid progression of disease on the 07/10 MRI compared to the one done just several weeks prior that was intended as a baseline prior to treatment, specifically on 06/19.      With this rapid progression of disease, I have discussed this in depth with Dr. Gracia from Neurosurgery.  We have set up an appointment for Dr. Gracia to meet with the patient, also, today.  I best addressed the patient's and his wife's questions.  I encouraged them to keep an open mind about discussion about further debulking and attempt at surgical resection through Dr. Gracia, as Dr. Gracia may be able to also include laser or thermal ablation as part of that procedure.  I did state that overall while the patient did have a gross total resection done up-front, upfront treatment would portend the best attempt at intent-to-cure therapy for which generally is pretty aggressive and highly recurrent disease.  Unfortunately, with progression after just several months of temozolomide therapy, it  portends a particularly aggressive form of his glioblastoma in this patient specifically.      They stated understanding, and in fact, I think there is a significant amount of progression in the last few months and disappointingly after excellent debulking surgery by Dr. Gracia in May.  I would favor potential debulking or attempt at full resection of what is remaining in the tumor with axillary treatment attempt, but they will discuss this with Dr. Gracia further today.  Now that we have clarified a time line of therapy, I do not think he particularly had progression of disease while on the lomustine, as he took that late as of 07/04 and not several weeks prior.  Thus, that drug does remain of potential utility going forward in the postsurgical setting assuming he goes forward with the surgery.  I would also look to do genomic profiling of either the newly detected tumor from a few weeks from now, or if they opt not to go forward with surgery, then it is most reasonable to resect that tumor from May.  If that yields any targetable mutations that could be targetable per the NCI-MATCH or other trials or even off-label therapy of the actionable targets, we will certainly take that into consideration.  This segues into the patient's wife's question about right-to-try therapy.  I provided an [facility]-based printout report on frequently answered questions, and we clarified that right to try does not necessarily mean asking for drugs that would be of no medical use, but rather right to ask and explore options for other drugs on and off label through pharmaceutical companies for compassionate use.  He and his wife stated full understanding of the above.  In addition, we do have a clinical trial brought here by my colleague, Dr. Kylee Noble.  This is a Phase 2 trial using the most dosing emulsion combination of [id number] drug.  This is a BBI with bevacizumab versus bevacizumab alone on recurrent glioblastoma.  I have discussed  this previously.  I did print out eligibility and criteria, but I have held off on providing it to the patient now until they have further discussion with Dr. Gracia.  I reviewed all of the above to the best of my ability.  We will not make any firm followup plans until determining whether or not the patient will go forward with surgery that is currently planned for 07/25.       I spent 45 minutes in consultation, including H&P and Discussion. >50% of time was spent in counseling and in coordination of care.    Augustus Armstrong MD, PhD                 abdominal region

## 2021-04-27 NOTE — ED ADULT TRIAGE NOTE - CHIEF COMPLAINT QUOTE
pt c/o RLQ pain radiating to pelvic with n/v . pt states this is chronic but pain has been worse in the last hour when she heard "something Pop". denies injury. PMH chronic back pain.

## 2021-04-27 NOTE — ED PROVIDER NOTE - CLINICAL SUMMARY MEDICAL DECISION MAKING FREE TEXT BOX
34 y/o female here with chronic abdominal pain possibly related to pelvic bony lesion p/w acute worsening. Will provide pain control, antiemetic, obtain CT, and reassess.

## 2021-04-27 NOTE — ED ADULT NURSE NOTE - OBJECTIVE STATEMENT
Pt received with c/o lower abdominal pain x last night, worse today. Pt states pain is excruciating and feels like something is popping and its very tight. Pain is said to radiate to the R thigh and back. Reports nausea but no vomiting. Medicated per order. 18g IV to R AC. Pending CT

## 2021-04-28 VITALS
HEART RATE: 89 BPM | DIASTOLIC BLOOD PRESSURE: 60 MMHG | RESPIRATION RATE: 18 BRPM | TEMPERATURE: 99 F | OXYGEN SATURATION: 99 % | SYSTOLIC BLOOD PRESSURE: 104 MMHG

## 2021-04-28 RX ORDER — MORPHINE SULFATE 50 MG/1
4 CAPSULE, EXTENDED RELEASE ORAL ONCE
Refills: 0 | Status: DISCONTINUED | OUTPATIENT
Start: 2021-04-28 | End: 2021-04-28

## 2021-04-28 RX ADMIN — MORPHINE SULFATE 4 MILLIGRAM(S): 50 CAPSULE, EXTENDED RELEASE ORAL at 00:26

## 2021-07-23 ENCOUNTER — APPOINTMENT (OUTPATIENT)
Dept: ULTRASOUND IMAGING | Facility: IMAGING CENTER | Age: 36
End: 2021-07-23
Payer: COMMERCIAL

## 2021-07-23 ENCOUNTER — OUTPATIENT (OUTPATIENT)
Dept: OUTPATIENT SERVICES | Facility: HOSPITAL | Age: 36
LOS: 1 days | End: 2021-07-23
Payer: COMMERCIAL

## 2021-07-23 DIAGNOSIS — Z00.8 ENCOUNTER FOR OTHER GENERAL EXAMINATION: ICD-10-CM

## 2021-07-23 PROCEDURE — 76536 US EXAM OF HEAD AND NECK: CPT

## 2021-07-23 PROCEDURE — 76536 US EXAM OF HEAD AND NECK: CPT | Mod: 26

## 2021-10-19 ENCOUNTER — APPOINTMENT (OUTPATIENT)
Dept: OBGYN | Facility: CLINIC | Age: 36
End: 2021-10-19
Payer: COMMERCIAL

## 2021-10-19 VITALS — BODY MASS INDEX: 24.28 KG/M2 | WEIGHT: 155 LBS | SYSTOLIC BLOOD PRESSURE: 110 MMHG | DIASTOLIC BLOOD PRESSURE: 73 MMHG

## 2021-10-19 PROCEDURE — 99204 OFFICE O/P NEW MOD 45 MIN: CPT

## 2021-10-19 NOTE — PHYSICAL EXAM
[Appropriately responsive] : appropriately responsive [Alert] : alert [No Acute Distress] : no acute distress [Soft] : soft [Non-tender] : non-tender [Non-distended] : non-distended [Oriented x3] : oriented x3 [Labia Majora] : normal [Labia Minora] : normal [Normal] : normal [Uterine Adnexae] : normal [Adnexa Tenderness On The Right] : tender

## 2021-10-19 NOTE — PLAN
[FreeTextEntry1] : 36  LMP 10/11/21 Presents for 2nd opinion. \par \par #chronic pelvic pain\par -reviewed prior MRI imaging and previous urology and rheumatology notes\par -discussed that many times we do not have a cause of chronic pelvic pain\par -discussed possibility of endometriosis but unclear given that OCPs did not work\par -Discussed that hysterectomy or myomectomy are unlikely to decrease or remove this pain\par -Rx for TVUS given to evaluate uterus and RLQ tenderness\par -Declines OCP management or IUD for hormonal management of pelvic pain \par -referral given for pelvic floor PT \par \par RTO after TVUS

## 2021-10-19 NOTE — HISTORY OF PRESENT ILLNESS
[Patient reported PAP Smear was normal] : Patient reported PAP Smear was normal [Normal Amount/Duration] :  normal amount and duration [Regular Cycle Intervals] : periods have been regular [Frequency: Q ___ days] : menstrual periods occur approximately every [unfilled] days [Menstrual Cramps] : menstrual cramps [Currently Active] : currently active [Men] : men [Vaginal] : vaginal [No] : No [Condoms] : Condoms [Patient refuses STI testing] : Patient refuses STI testing [PapSmeardate] : 2021 [FreeTextEntry1] : 10/11/21

## 2022-03-24 LAB
MISCELLANEOUS TEST: NORMAL
PROC NAME: NORMAL

## 2022-10-12 NOTE — ED ADULT TRIAGE NOTE - NS ED NURSE DIRECT TO ROOM YN
Case Management Discharge Planning    Admission Date: 10/11/2022  GMLOS: 3.4  ALOS: 1    6-Clicks ADL Score: 24  6-Clicks Mobility Score: 24      Anticipated Discharge Dispo: Discharge Disposition: Discharged to home/self care (01)    DME Needed: No    Action(s) Taken: DC Assessment Complete (See below)    Escalations Completed: None    Medically Clear: No    Next Steps: Anticipate discharge home, no discharge needs or barries identified.     Barriers to Discharge: Medical clearance      Care Transition Team Assessment    I met with the patient at bedside to complete this assessment. Patient lives in a single story home with one step to enter with his spouse, Ritesh. Ritesh is patient's primary support at home and will provide a ride home upon discharge. Patient has never had home health and has no DME. PCP is Dr Ordoñez. Patient fills prescriptions at [facility].     Information Source  Orientation Level: Oriented X4  Information Given By: Patient  Who is responsible for making decisions for patient? : Patient    Readmission Evaluation  Is this a readmission?: No    Elopement Risk  Legal Hold: No  Ambulatory or Self Mobile in Wheelchair: Yes  Disoriented: No  Psychiatric Symptoms: None  History of Wandering: No  Elopement this Admit: No  Vocalizing Wanting to Leave: No  Displays Behaviors, Body Language Wanting to Leave: No-Not at Risk for Elopement  Elopement Risk: Not at Risk for Elopement    Interdisciplinary Discharge Planning  Does Admitting Nurse Feel This Could be a Complex Discharge?: No  Lives with - Patient's Self Care Capacity: Spouse  Patient or legal guardian wants to designate a caregiver: Yes  Caregiver name: Ritesh  Support Systems: Spouse / Significant Other  Housing / Facility: [address]  Do You Take your Prescribed Medications Regularly: Yes  Able to Return to Previous ADL's: Yes  Mobility Issues: No  Prior Services: None  Assistance Needed: Unknown at this Time  Durable Medical Equipment: Not  Applicable    Discharge Preparedness  What is your plan after discharge?: Home with help  What are your discharge supports?: Spouse  Prior Functional Level: Ambulatory, Independent with Activities of Daily Living    Functional Assesment  Prior Functional Level: Ambulatory, Independent with Activities of Daily Living    Finances  Financial Barriers to Discharge: No  Prescription Coverage: Yes    Vision / Hearing Impairment  Vision Impairment : No  Right Eye Vision: Impaired, Wears Glasses  Left Eye Vision: Impaired, Wears Glasses  Hearing Impairment : No    Values / Beliefs / Concerns  Values / Beliefs Concerns : No    Advance Directive  Advance Directive?: None  Advance Directive offered?: AD Booklet refused    Domestic Abuse  Have you ever been the victim of abuse or violence?: No  Physical Abuse or Sexual Abuse: No  Verbal Abuse or Emotional Abuse: No  Possible Abuse/Neglect Reported to:: Not Applicable              Anticipated Discharge Information  Discharge Disposition: Discharged to home/self care (01)         No

## 2023-01-09 NOTE — ED PROVIDER NOTE - NS CPE EDP MUSC SPINE EXAM
negative no deformity, pain or tenderness. no restriction of movement normal/normal affect/alert and oriented x3/normal behavior

## 2023-01-30 ENCOUNTER — EMERGENCY (EMERGENCY)
Facility: HOSPITAL | Age: 38
LOS: 1 days | Discharge: HOME | End: 2023-01-30
Attending: STUDENT IN AN ORGANIZED HEALTH CARE EDUCATION/TRAINING PROGRAM | Admitting: STUDENT IN AN ORGANIZED HEALTH CARE EDUCATION/TRAINING PROGRAM
Payer: COMMERCIAL

## 2023-01-30 VITALS
RESPIRATION RATE: 18 BRPM | TEMPERATURE: 98 F | SYSTOLIC BLOOD PRESSURE: 130 MMHG | HEART RATE: 92 BPM | DIASTOLIC BLOOD PRESSURE: 90 MMHG | OXYGEN SATURATION: 99 %

## 2023-01-30 PROCEDURE — 12001 RPR S/N/AX/GEN/TRNK 2.5CM/<: CPT

## 2023-01-30 PROCEDURE — 99284 EMERGENCY DEPT VISIT MOD MDM: CPT | Mod: 25

## 2023-01-30 RX ORDER — TETANUS TOXOID, REDUCED DIPHTHERIA TOXOID AND ACELLULAR PERTUSSIS VACCINE, ADSORBED 5; 2.5; 8; 8; 2.5 [IU]/.5ML; [IU]/.5ML; UG/.5ML; UG/.5ML; UG/.5ML
0.5 SUSPENSION INTRAMUSCULAR ONCE
Refills: 0 | Status: COMPLETED | OUTPATIENT
Start: 2023-01-30 | End: 2023-01-30

## 2023-01-30 RX ADMIN — TETANUS TOXOID, REDUCED DIPHTHERIA TOXOID AND ACELLULAR PERTUSSIS VACCINE, ADSORBED 0.5 MILLILITER(S): 5; 2.5; 8; 8; 2.5 SUSPENSION INTRAMUSCULAR at 21:15

## 2023-01-30 NOTE — ED PROVIDER NOTE - PHYSICAL EXAMINATION
Abner PACKER:  VITALS: Initial triage and subsequent vitals have been reviewed by me.  GEN APPEARANCE: WDWN, alert and cooperative, non-toxic appearing and in NAD  HEAD: Atraumatic, normocephalic   EYES: PERRLa, EOMI, vision grossly intact.   EARS: Gross hearing intact.   NOSE: No nasal discharge, no external evidence of epistaxis.   NECK: Supple  LUNGS: breathing comfortable   MSK/EXT: Spine appears normal, no spine point tenderness. No CVA ttp. Normal muscular development. Pelvis stable. No obvious joint or bony deformity, no peripheral edema.   NEURO: Alert, follows commands. Weight bearing normal. Speech normal. Sensation and motor normal x4 extremities.   SKIN:  Normal color for race, warm, dry and intact. No evidence of rash.  PSYCH: Normal mood and affect.  SKIN: Near complete skin avulsion to aspect of distal left pinky small edge of nail bed with nail intact also avulsed.

## 2023-01-30 NOTE — ED ADULT NURSE NOTE - NSFALLRSKASSESSTYPE_ED_ALL_ED
Pt resting on cot. Pt VSS. Pt respirations easy and unlabored. Pt denies needs. Call light in reach. Will continue to monitor,.      Cecily Mcfarland RN  11/08/20 1921 Initial (On Arrival)

## 2023-01-30 NOTE — ED ADULT TRIAGE NOTE - CHIEF COMPLAINT QUOTE
pt complaining of Lac to finger R hand pinky finger. Pt states she was using a knife. Pt denies use of blood thinners.

## 2023-01-30 NOTE — ED PROVIDER NOTE - OBJECTIVE STATEMENT
Abner PACKER: 37-year-old female, no past medical history, presents with a chief complaint of laceration to distal end of left pinky occurring around 6 PM earlier tonight.  Patient reports she was using a mandolin slicer when she cut her finger.  No other injuries.  Patient cannot recall last tetanus.  Patient can move everything and feel everything.  Patient attempted to control bleeding prior to ED arrival.  No other injuries no other complaints.

## 2023-01-30 NOTE — ED PROVIDER NOTE - PATIENT PORTAL LINK FT
You can access the FollowMyHealth Patient Portal offered by [facility] by registering at the following website: [email or web address]. By joining [employer]’s FollowMyHealth portal, you will also be able to view your health information using other applications (apps) compatible with our system.

## 2023-01-30 NOTE — ED ADULT NURSE NOTE - OBJECTIVE STATEMENT
Pt received in room 16, alert & awake, A&OX4. Pt complaint of R pinky cut w/ a knife while cooking. Pt noted to have bleeding, straight cut on pinky, no detachment noted, pt received TDAP vaccine, pending Lac repair by MD.

## 2023-01-30 NOTE — ED PROVIDER NOTE - NSFOLLOWUPINSTRUCTIONS_ED_ALL_ED_FT
Thank you for visiting our Emergency Department, it has been a pleasure taking part in your healthcare.    Your discharge diagnosis is: finger laceration  Please take all discharge medications as indicated below:  Take Motrin/Tylenol for pain as needed, please follow instructions on manufacturers label. If you have any questions please consult a pharmacist or your PMD.  Please follow up with your PMD within x48 hours.  PLease follow up with hand within x48 hours  A list of providers for follow up has been given to you.  A copy of resulted labs, imaging, and findings have been provided to you.   You have had a detailed discussion with your provider regarding your diagnosis, care management and discharge planning including, but not limited to: return precautions, follow up visits with existing or new providers, new prescriptions and/or medication changes, wound and/or splint/cast care or other care   aspects specific to your diagnosis and treatment. You have been given the opportunity to have your questions answered. At this time you have been deemed stable and fit for discharge.  Return precautions to the Emergency Department include but are not limited to: unrelenting nausea, vomiting, fever, chills, chest pain, shortness of breath, dizziness, chest or abdominal pain, worsening back pain, syncope, blood in urine or stool, headache that doesn't resolve, numbness or tingling, loss of sensation, loss of motor function, or any other concerning symptoms.

## 2023-01-30 NOTE — ED PROVIDER NOTE - CLINICAL SUMMARY MEDICAL DECISION MAKING FREE TEXT BOX
Abner PACKER: 37-year-old female, no past medical history, presents with a chief complaint of laceration to distal end of left pinky occurring around 6 PM earlier tonight.  Patient reports she was using a mandolin slicer when she cut her finger.  No other injuries.  Patient cannot recall last tetanus.  Patient can move everything and feel everything.  Patient attempted to control bleeding prior to ED arrival. exam vss non toxic PE as above ddx c/f skin lac v avulsion will attempt repair, though wound may require complete avulsion, will update willie mensah thereafter.

## 2023-05-03 NOTE — ED ADULT TRIAGE NOTE - BP NONINVASIVE DIASTOLIC (MM HG)
Subjective:      Patient ID:  Lucrecia Louie is a 57 y.o. female who presents for   Chief Complaint   Patient presents with    Skin Check     TBSE      Here for Total Body Skin Exam  Last seen by dermatology 2021  No h/o nmsc or mm    Pt reports a mild amount of lifetime sun exposure    Patient with specific complaint of lesion(s)  Location: L-flank   Duration: 2 yrs   Symptoms: growing   Relieving factors/Previous treatments: none         Review of Systems   Constitutional:  Negative for fever, chills, weight loss, weight gain and night sweats.   Skin:  Negative for daily sunscreen use, activity-related sunscreen use, recent sunburn and wears hat.   Hematologic/Lymphatic: Bruises/bleeds easily (Bruises).     Objective:   Physical Exam   Constitutional: She appears well-developed and well-nourished.   Neurological: She is alert and oriented to person, place, and time.   Psychiatric: She has a normal mood and affect.   Skin:   Areas Examined (abnormalities noted in diagram):   Head / Face Inspection Performed  Neck Inspection Performed  Chest / Axilla Inspection Performed  Abdomen Inspection Performed  Back Inspection Performed  RUE Inspected  LUE Inspection Performed  RLE Inspected  LLE Inspection Performed  Nails and Digits Inspection Performed                   Diagram Legend     Erythematous scaling macule/papule c/w actinic keratosis       Vascular papule c/w angioma      Pigmented verrucoid papule/plaque c/w seborrheic keratosis      Yellow umbilicated papule c/w sebaceous hyperplasia      Irregularly shaped tan macule c/w lentigo     1-2 mm smooth white papules consistent with Milia      Movable subcutaneous cyst with punctum c/w epidermal inclusion cyst      Subcutaneous movable cyst c/w pilar cyst      Firm pink to brown papule c/w dermatofibroma      Pedunculated fleshy papule(s) c/w skin tag(s)      Evenly pigmented macule c/w junctional nevus     Mildly variegated pigmented, slightly irregular-bordered  90 macule c/w mildly atypical nevus      Flesh colored to evenly pigmented papule c/w intradermal nevus       Pink pearly papule/plaque c/w basal cell carcinoma      Erythematous hyperkeratotic cursted plaque c/w SCC      Surgical scar with no sign of skin cancer recurrence      Open and closed comedones      Inflammatory papules and pustules      Verrucoid papule consistent consistent with wart     Erythematous eczematous patches and plaques     Dystrophic onycholytic nail with subungual debris c/w onychomycosis     Umbilicated papule    Erythematous-base heme-crusted tan verrucoid plaque consistent with inflamed seborrheic keratosis     Erythematous Silvery Scaling Plaque c/w Psoriasis     See annotation      Assessment / Plan:        Skin cancer screening  Total body skin examination performed today including at least 12 points as noted in physical examination. No lesions suspicious for malignancy noted.  Patient instructed in importance of daily broad spectrum sunscreen use with spf at least 30. Sun avoidance and topical protection/protective clothing discussed.    SK (seborrheic keratosis)  These are benign inherited growths without a malignant potential. Reassurance given to patient. No treatment is necessary.   Treatment of benign, asymptomatic lesions may be considered cosmetic.  Warned about risk of hypo- or hyperpigmentation with treatment and risk of recurrence.    Multiple benign nevi  Benign-appearing nevi present on exam today. Reassurance provided. Periodically examine moles and return to clinic if any moles change or become symptomatic (bleeding, itching, pain, etc).    Lentigines  These are benign sun spots which should be monitored for changes. Patient instructed in importance of daily broad spectrum sunscreen use with spf at least 30. Sun avoidance and topical protection/protective clothing discussed.      Follow up in about 1 year (around 5/3/2024) for skin check or sooner for any concerns.

## 2024-08-04 ENCOUNTER — NON-APPOINTMENT (OUTPATIENT)
Age: 39
End: 2024-08-04

## 2024-08-05 ENCOUNTER — APPOINTMENT (OUTPATIENT)
Dept: ORTHOPEDIC SURGERY | Facility: CLINIC | Age: 39
End: 2024-08-05

## 2024-08-05 ENCOUNTER — NON-APPOINTMENT (OUTPATIENT)
Age: 39
End: 2024-08-05

## 2024-08-05 PROBLEM — M25.571 RIGHT ANKLE PAIN: Status: ACTIVE | Noted: 2024-08-05

## 2024-08-05 PROBLEM — M22.41 CHONDROMALACIA PATELLAE OF RIGHT KNEE: Status: ACTIVE | Noted: 2024-08-05

## 2024-08-05 PROBLEM — M22.2X1 PATELLOFEMORAL PAIN SYNDROME OF BOTH KNEES: Status: ACTIVE | Noted: 2024-08-05

## 2024-08-05 PROCEDURE — 73562 X-RAY EXAM OF KNEE 3: CPT | Mod: 50

## 2024-08-05 PROCEDURE — 99203 OFFICE O/P NEW LOW 30 MIN: CPT

## 2024-08-05 PROCEDURE — 73610 X-RAY EXAM OF ANKLE: CPT | Mod: RT

## 2024-08-29 ENCOUNTER — APPOINTMENT (OUTPATIENT)
Dept: ORTHOPEDIC SURGERY | Facility: CLINIC | Age: 39
End: 2024-08-29
Payer: COMMERCIAL

## 2024-08-29 VITALS
WEIGHT: 160 LBS | HEIGHT: 67 IN | DIASTOLIC BLOOD PRESSURE: 70 MMHG | SYSTOLIC BLOOD PRESSURE: 109 MMHG | HEART RATE: 85 BPM | BODY MASS INDEX: 25.11 KG/M2

## 2024-08-29 DIAGNOSIS — M22.42 CHONDROMALACIA PATELLAE, LEFT KNEE: ICD-10-CM

## 2024-08-29 DIAGNOSIS — M22.2X2 PATELLOFEMORAL DISORDERS, LEFT KNEE: ICD-10-CM

## 2024-08-29 DIAGNOSIS — M23.92 UNSPECIFIED INTERNAL DERANGEMENT OF LEFT KNEE: ICD-10-CM

## 2024-08-29 PROCEDURE — 99213 OFFICE O/P EST LOW 20 MIN: CPT

## 2024-09-10 ENCOUNTER — APPOINTMENT (OUTPATIENT)
Dept: MRI IMAGING | Facility: CLINIC | Age: 39
End: 2024-09-10
Payer: COMMERCIAL

## 2024-09-10 ENCOUNTER — OUTPATIENT (OUTPATIENT)
Dept: OUTPATIENT SERVICES | Facility: HOSPITAL | Age: 39
LOS: 1 days | End: 2024-09-10
Payer: COMMERCIAL

## 2024-09-10 DIAGNOSIS — M23.92 UNSPECIFIED INTERNAL DERANGEMENT OF LEFT KNEE: ICD-10-CM

## 2024-09-10 DIAGNOSIS — Z00.8 ENCOUNTER FOR OTHER GENERAL EXAMINATION: ICD-10-CM

## 2024-09-10 PROCEDURE — 73721 MRI JNT OF LWR EXTRE W/O DYE: CPT

## 2024-09-10 PROCEDURE — 73721 MRI JNT OF LWR EXTRE W/O DYE: CPT | Mod: 26,LT

## 2024-09-19 ENCOUNTER — APPOINTMENT (OUTPATIENT)
Dept: ORTHOPEDIC SURGERY | Facility: CLINIC | Age: 39
End: 2024-09-19
Payer: COMMERCIAL

## 2024-09-19 VITALS — WEIGHT: 160 LBS | BODY MASS INDEX: 25.11 KG/M2 | HEIGHT: 67 IN

## 2024-09-19 DIAGNOSIS — M67.969 UNSPECIFIED DISORDER OF SYNOVIUM AND TENDON, UNSPECIFIED LOWER LEG: ICD-10-CM

## 2024-09-19 PROCEDURE — 99213 OFFICE O/P EST LOW 20 MIN: CPT
